# Patient Record
Sex: MALE | Race: WHITE | NOT HISPANIC OR LATINO | Employment: OTHER | URBAN - METROPOLITAN AREA
[De-identification: names, ages, dates, MRNs, and addresses within clinical notes are randomized per-mention and may not be internally consistent; named-entity substitution may affect disease eponyms.]

---

## 2020-10-14 ENCOUNTER — TELEPHONE (OUTPATIENT)
Dept: FAMILY MEDICINE CLINIC | Facility: CLINIC | Age: 70
End: 2020-10-14

## 2020-10-14 ENCOUNTER — OFFICE VISIT (OUTPATIENT)
Dept: FAMILY MEDICINE CLINIC | Facility: CLINIC | Age: 70
End: 2020-10-14
Payer: COMMERCIAL

## 2020-10-14 VITALS
SYSTOLIC BLOOD PRESSURE: 130 MMHG | TEMPERATURE: 97 F | WEIGHT: 231 LBS | DIASTOLIC BLOOD PRESSURE: 84 MMHG | HEIGHT: 70 IN | OXYGEN SATURATION: 98 % | BODY MASS INDEX: 33.07 KG/M2 | HEART RATE: 75 BPM

## 2020-10-14 DIAGNOSIS — J45.20 MILD INTERMITTENT EXTRINSIC ASTHMA WITHOUT COMPLICATION: ICD-10-CM

## 2020-10-14 DIAGNOSIS — Z23 ENCOUNTER FOR IMMUNIZATION: ICD-10-CM

## 2020-10-14 DIAGNOSIS — I10 ESSENTIAL HYPERTENSION: Primary | ICD-10-CM

## 2020-10-14 DIAGNOSIS — K92.1 BLOOD IN STOOL: ICD-10-CM

## 2020-10-14 DIAGNOSIS — Z13.220 ENCOUNTER FOR LIPID SCREENING FOR CARDIOVASCULAR DISEASE: ICD-10-CM

## 2020-10-14 DIAGNOSIS — Z13.6 ENCOUNTER FOR LIPID SCREENING FOR CARDIOVASCULAR DISEASE: ICD-10-CM

## 2020-10-14 DIAGNOSIS — K52.9 COLITIS: ICD-10-CM

## 2020-10-14 DIAGNOSIS — E78.5 HYPERLIPIDEMIA, UNSPECIFIED HYPERLIPIDEMIA TYPE: ICD-10-CM

## 2020-10-14 PROBLEM — J45.909 EXTRINSIC ASTHMA WITHOUT COMPLICATION: Status: ACTIVE | Noted: 2020-10-14

## 2020-10-14 PROCEDURE — 1101F PT FALLS ASSESS-DOCD LE1/YR: CPT | Performed by: FAMILY MEDICINE

## 2020-10-14 PROCEDURE — 1160F RVW MEDS BY RX/DR IN RCRD: CPT | Performed by: FAMILY MEDICINE

## 2020-10-14 PROCEDURE — 3288F FALL RISK ASSESSMENT DOCD: CPT | Performed by: FAMILY MEDICINE

## 2020-10-14 PROCEDURE — 99204 OFFICE O/P NEW MOD 45 MIN: CPT | Performed by: FAMILY MEDICINE

## 2020-10-14 PROCEDURE — 3725F SCREEN DEPRESSION PERFORMED: CPT | Performed by: FAMILY MEDICINE

## 2020-10-14 PROCEDURE — 3079F DIAST BP 80-89 MM HG: CPT | Performed by: FAMILY MEDICINE

## 2020-10-14 PROCEDURE — G0008 ADMIN INFLUENZA VIRUS VAC: HCPCS

## 2020-10-14 PROCEDURE — 90662 IIV NO PRSV INCREASED AG IM: CPT

## 2020-10-14 PROCEDURE — 1036F TOBACCO NON-USER: CPT | Performed by: FAMILY MEDICINE

## 2020-10-14 RX ORDER — ZOSTER VACCINE RECOMBINANT, ADJUVANTED 50 MCG/0.5
0.5 KIT INTRAMUSCULAR ONCE
Qty: 1 EACH | Refills: 1 | Status: SHIPPED | OUTPATIENT
Start: 2020-10-14 | End: 2020-10-14

## 2020-10-14 RX ORDER — AMLODIPINE BESYLATE 10 MG/1
10 TABLET ORAL DAILY
COMMUNITY
Start: 2020-09-01 | End: 2020-10-28 | Stop reason: SDUPTHER

## 2020-10-14 RX ORDER — ALBUTEROL SULFATE 90 UG/1
2 AEROSOL, METERED RESPIRATORY (INHALATION) EVERY 6 HOURS PRN
COMMUNITY

## 2020-10-14 RX ORDER — MESALAMINE 375 MG/1
1.5 CAPSULE, EXTENDED RELEASE ORAL EVERY MORNING
COMMUNITY
Start: 2020-10-02 | End: 2020-11-25 | Stop reason: SDUPTHER

## 2020-10-14 RX ORDER — DIPHENOXYLATE HYDROCHLORIDE AND ATROPINE SULFATE 2.5; .025 MG/1; MG/1
1 TABLET ORAL DAILY
COMMUNITY

## 2020-10-14 RX ORDER — BENAZEPRIL HYDROCHLORIDE 20 MG/1
20 TABLET ORAL EVERY MORNING
COMMUNITY
Start: 2020-09-01 | End: 2020-10-28 | Stop reason: SDUPTHER

## 2020-10-21 LAB
BASOPHILS # BLD AUTO: 70 CELLS/UL (ref 0–200)
BASOPHILS NFR BLD AUTO: 0.9 %
BUN SERPL-MCNC: 13 MG/DL (ref 7–25)
BUN/CREAT SERPL: ABNORMAL (CALC) (ref 6–22)
CALCIUM SERPL-MCNC: 9.2 MG/DL (ref 8.6–10.3)
CHLORIDE SERPL-SCNC: 102 MMOL/L (ref 98–110)
CHOLEST SERPL-MCNC: 212 MG/DL
CHOLEST/HDLC SERPL: 5.3 (CALC)
CO2 SERPL-SCNC: 33 MMOL/L (ref 20–32)
CREAT SERPL-MCNC: 0.79 MG/DL (ref 0.7–1.25)
EOSINOPHIL # BLD AUTO: 273 CELLS/UL (ref 15–500)
EOSINOPHIL NFR BLD AUTO: 3.5 %
ERYTHROCYTE [DISTWIDTH] IN BLOOD BY AUTOMATED COUNT: 13.1 % (ref 11–15)
GLUCOSE SERPL-MCNC: 92 MG/DL (ref 65–99)
HCT VFR BLD AUTO: 49.4 % (ref 38.5–50)
HDLC SERPL-MCNC: 40 MG/DL
HGB BLD-MCNC: 16.1 G/DL (ref 13.2–17.1)
LDLC SERPL CALC-MCNC: 140 MG/DL (CALC)
LYMPHOCYTES # BLD AUTO: 1654 CELLS/UL (ref 850–3900)
LYMPHOCYTES NFR BLD AUTO: 21.2 %
MCH RBC QN AUTO: 28.5 PG (ref 27–33)
MCHC RBC AUTO-ENTMCNC: 32.6 G/DL (ref 32–36)
MCV RBC AUTO: 87.6 FL (ref 80–100)
MONOCYTES # BLD AUTO: 671 CELLS/UL (ref 200–950)
MONOCYTES NFR BLD AUTO: 8.6 %
NEUTROPHILS # BLD AUTO: 5132 CELLS/UL (ref 1500–7800)
NEUTROPHILS NFR BLD AUTO: 65.8 %
NONHDLC SERPL-MCNC: 172 MG/DL (CALC)
PLATELET # BLD AUTO: 255 THOUSAND/UL (ref 140–400)
PMV BLD REES-ECKER: 10.1 FL (ref 7.5–12.5)
POTASSIUM SERPL-SCNC: 4.5 MMOL/L (ref 3.5–5.3)
RBC # BLD AUTO: 5.64 MILLION/UL (ref 4.2–5.8)
SL AMB EGFR AFRICAN AMERICAN: 107 ML/MIN/1.73M2
SL AMB EGFR NON AFRICAN AMERICAN: 92 ML/MIN/1.73M2
SODIUM SERPL-SCNC: 139 MMOL/L (ref 135–146)
TRIGL SERPL-MCNC: 185 MG/DL
WBC # BLD AUTO: 7.8 THOUSAND/UL (ref 3.8–10.8)

## 2020-10-28 ENCOUNTER — OFFICE VISIT (OUTPATIENT)
Dept: FAMILY MEDICINE CLINIC | Facility: CLINIC | Age: 70
End: 2020-10-28
Payer: COMMERCIAL

## 2020-10-28 VITALS
BODY MASS INDEX: 32.99 KG/M2 | DIASTOLIC BLOOD PRESSURE: 84 MMHG | TEMPERATURE: 97.3 F | HEART RATE: 77 BPM | SYSTOLIC BLOOD PRESSURE: 132 MMHG | OXYGEN SATURATION: 97 % | WEIGHT: 230.4 LBS | HEIGHT: 70 IN

## 2020-10-28 DIAGNOSIS — Z00.00 ENCOUNTER FOR MEDICARE ANNUAL WELLNESS EXAM: Primary | ICD-10-CM

## 2020-10-28 DIAGNOSIS — Z13.6 ENCOUNTER FOR ABDOMINAL AORTIC ANEURYSM (AAA) SCREENING: ICD-10-CM

## 2020-10-28 DIAGNOSIS — I10 ESSENTIAL HYPERTENSION: ICD-10-CM

## 2020-10-28 PROCEDURE — 3079F DIAST BP 80-89 MM HG: CPT | Performed by: FAMILY MEDICINE

## 2020-10-28 PROCEDURE — 1125F AMNT PAIN NOTED PAIN PRSNT: CPT | Performed by: FAMILY MEDICINE

## 2020-10-28 PROCEDURE — G0439 PPPS, SUBSEQ VISIT: HCPCS | Performed by: FAMILY MEDICINE

## 2020-10-28 PROCEDURE — 1170F FXNL STATUS ASSESSED: CPT | Performed by: FAMILY MEDICINE

## 2020-10-28 PROCEDURE — 1160F RVW MEDS BY RX/DR IN RCRD: CPT | Performed by: FAMILY MEDICINE

## 2020-10-28 PROCEDURE — 3725F SCREEN DEPRESSION PERFORMED: CPT | Performed by: FAMILY MEDICINE

## 2020-10-28 PROCEDURE — 3075F SYST BP GE 130 - 139MM HG: CPT | Performed by: FAMILY MEDICINE

## 2020-10-28 PROCEDURE — 3008F BODY MASS INDEX DOCD: CPT | Performed by: FAMILY MEDICINE

## 2020-10-28 RX ORDER — BENAZEPRIL HYDROCHLORIDE 20 MG/1
20 TABLET ORAL EVERY MORNING
Qty: 90 TABLET | Refills: 1 | Status: SHIPPED | OUTPATIENT
Start: 2020-10-28 | End: 2021-04-22

## 2020-10-28 RX ORDER — AMLODIPINE BESYLATE 10 MG/1
10 TABLET ORAL DAILY
Qty: 90 TABLET | Refills: 1 | Status: SHIPPED | OUTPATIENT
Start: 2020-10-28 | End: 2021-04-22

## 2020-11-18 ENCOUNTER — HOSPITAL ENCOUNTER (OUTPATIENT)
Dept: ULTRASOUND IMAGING | Facility: HOSPITAL | Age: 70
Discharge: HOME | End: 2020-11-18
Attending: FAMILY MEDICINE
Payer: COMMERCIAL

## 2020-11-18 DIAGNOSIS — Z13.6 ENCOUNTER FOR ABDOMINAL AORTIC ANEURYSM (AAA) SCREENING: ICD-10-CM

## 2020-11-18 PROCEDURE — 76706 US ABDL AORTA SCREEN AAA: CPT

## 2020-11-25 DIAGNOSIS — K52.9 COLITIS: ICD-10-CM

## 2020-11-25 DIAGNOSIS — K92.1 BLOOD IN STOOL: ICD-10-CM

## 2020-11-25 RX ORDER — MESALAMINE 375 MG/1
1.5 CAPSULE, EXTENDED RELEASE ORAL EVERY MORNING
Qty: 120 CAPSULE | Refills: 2 | Status: SHIPPED | OUTPATIENT
Start: 2020-11-25 | End: 2020-11-25

## 2020-11-25 RX ORDER — MESALAMINE 375 MG/1
1.5 CAPSULE, EXTENDED RELEASE ORAL EVERY MORNING
Qty: 120 CAPSULE | Refills: 2 | Status: SHIPPED | OUTPATIENT
Start: 2020-11-25 | End: 2022-01-07 | Stop reason: RX

## 2021-01-11 ENCOUNTER — OFFICE VISIT (OUTPATIENT)
Dept: FAMILY MEDICINE CLINIC | Facility: CLINIC | Age: 71
End: 2021-01-11
Payer: COMMERCIAL

## 2021-01-11 VITALS — TEMPERATURE: 97 F

## 2021-01-11 DIAGNOSIS — B34.9 VIRAL INFECTION, UNSPECIFIED: Primary | ICD-10-CM

## 2021-01-11 PROCEDURE — U0003 INFECTIOUS AGENT DETECTION BY NUCLEIC ACID (DNA OR RNA); SEVERE ACUTE RESPIRATORY SYNDROME CORONAVIRUS 2 (SARS-COV-2) (CORONAVIRUS DISEASE [COVID-19]), AMPLIFIED PROBE TECHNIQUE, MAKING USE OF HIGH THROUGHPUT TECHNOLOGIES AS DESCRIBED BY CMS-2020-01-R: HCPCS | Performed by: FAMILY MEDICINE

## 2021-01-11 PROCEDURE — U0005 INFEC AGEN DETEC AMPLI PROBE: HCPCS | Performed by: FAMILY MEDICINE

## 2021-01-11 PROCEDURE — 99441 PR PHYS/QHP TELEPHONE EVALUATION 5-10 MIN: CPT | Performed by: FAMILY MEDICINE

## 2021-01-11 RX ORDER — FLUTICASONE PROPIONATE 50 MCG
1 SPRAY, SUSPENSION (ML) NASAL DAILY
Qty: 1 BOTTLE | Refills: 2 | Status: SHIPPED | OUTPATIENT
Start: 2021-01-11 | End: 2021-04-05

## 2021-01-11 NOTE — PROGRESS NOTES
Virtual Brief Visit  Assessment/Plan:    Problem List Items Addressed This Visit     None      Visit Diagnoses     Viral infection, unspecified    -  Primary    Relevant Medications    fluticasone (FLONASE) 50 mcg/act nasal spray    Other Relevant Orders    Novel Coronavirus (COVID-19), PCR [facility] - Collected in Office           Reason for visit is   Chief Complaint   Patient presents with    Sore Throat     x 1 week     Nasal Congestion    Fatigue    Virtual Brief Visit      Encounter provider El Scherer DO    Provider located at [address] [facility]  [address]  [facility] [address] [facility]  [phone]    Recent Visits  No visits were found meeting these conditions  Showing recent visits within past 7 days and meeting all other requirements     Today's Visits  Date Type Provider Dept   01/11/21 Office Visit El Scherer DO [facility] [address] [place] [address]   Showing today's visits and meeting all other requirements     Future Appointments  No visits were found meeting these conditions  Showing future appointments within next 150 days and meeting all other requirements      After connecting through telephone, the patient was identified by name and date of birth  Enrique Harden was informed that this is a telemedicine visit and that the visit is being conducted through telephone  My office door was closed  No one else was in the room  He acknowledged consent and understanding of privacy and security of the platform  The patient has agreed to participate and understands he can discontinue the visit at any time  Patient is aware this is a billable service  Subjective    Enrique Harden is a 78 y o  male for virtual visit  Done via telephone as video visit was attempted and failed  HPI     States that he began with cold symptoms about 1 week     Chills, sore throat, nasal congestion, fatigue, muscle aches, ear clogged/pressure  Denies headaches or cough  Denies SOB or CP  Denies N/V/diarrhea  Notes that he has only been around his sister and brother in law  They are not sick  Denies any known sick contacts  Notes that he has been in the food store, wearing mask  Using saline nasal spray  Using Coricidin HBP (notes that this irritate his colitis)  History reviewed  No pertinent past medical history  History reviewed  No pertinent surgical history  Current Outpatient Medications   Medication Sig Dispense Refill    albuterol (5 mg/mL) 0 5 % nebulizer solution Take 2 5 mg by nebulization every 6 (six) hours as needed for wheezing      albuterol (PROVENTIL HFA,VENTOLIN HFA) 90 mcg/act inhaler Inhale 2 puffs every 6 (six) hours as needed for wheezing      amLODIPine (NORVASC) 10 mg tablet Take 1 tablet (10 mg total) by mouth daily 90 tablet 1    benazepril (LOTENSIN) 20 mg tablet Take 1 tablet (20 mg total) by mouth every morning 90 tablet 1    fluticasone (FLONASE) 50 mcg/act nasal spray 1 spray into each nostril daily 1 Bottle 2    mesalamine (APRISO) 0 375 g 24 hr capsule TAKE 4 CAPSULES (1 5 G TOTAL) BY MOUTH EVERY MORNING ONCE A DAY 4 PILLS IN THE MORNING 120 capsule 2    multivitamin (THERAGRAN) TABS Take 1 tablet by mouth daily      Red Yeast Rice Extract (RED YEAST RICE PO) Take by mouth       No current facility-administered medications for this visit  No Known Allergies    Review of Systems   Constitutional: Positive for chills and fatigue  Negative for fever  HENT: Positive for congestion, ear pain and sore throat  Negative for ear discharge, hearing loss, postnasal drip, rhinorrhea and sinus pain  Eyes: Negative for pain  Respiratory: Negative for cough and shortness of breath  Cardiovascular: Negative for chest pain and leg swelling  Gastrointestinal: Negative for abdominal pain, constipation, diarrhea, nausea and vomiting     Genitourinary: Negative for difficulty urinating, dysuria, frequency and urgency  Musculoskeletal: Positive for myalgias  Negative for neck pain  Skin: Negative for rash  Neurological: Negative for dizziness and headaches  Psychiatric/Behavioral: Negative for sleep disturbance  Vitals:    01/11/21 1415   Temp: (!) 97 °F (36 1 °C)     I spent 6 minutes directly with the patient during this visit    VIRTUAL VISIT DISCLAIMER    Gavino Moralezes acknowledges that he has consented to an online visit or consultation  He understands that the online visit is based solely on information provided by him, and that, in the absence of a face-to-face physical evaluation by the physician, the diagnosis he receives is both limited and provisional in terms of accuracy and completeness  This is not intended to replace a full medical face-to-face evaluation by the physician  Gavino Calles understands and accepts these terms      DO [facility]  1/11/2021 3:52 PM

## 2021-01-13 LAB — SARS-COV-2 RNA SPEC QL NAA+PROBE: NOT DETECTED

## 2021-01-14 ENCOUNTER — OFFICE VISIT (OUTPATIENT)
Dept: FAMILY MEDICINE CLINIC | Facility: CLINIC | Age: 71
End: 2021-01-14
Payer: COMMERCIAL

## 2021-01-14 VITALS
SYSTOLIC BLOOD PRESSURE: 116 MMHG | WEIGHT: 232.2 LBS | HEART RATE: 69 BPM | OXYGEN SATURATION: 96 % | TEMPERATURE: 97.8 F | HEIGHT: 70 IN | BODY MASS INDEX: 33.24 KG/M2 | DIASTOLIC BLOOD PRESSURE: 86 MMHG

## 2021-01-14 DIAGNOSIS — J45.20 MILD INTERMITTENT EXTRINSIC ASTHMA WITHOUT COMPLICATION: ICD-10-CM

## 2021-01-14 DIAGNOSIS — J02.9 SORE THROAT: ICD-10-CM

## 2021-01-14 DIAGNOSIS — J02.9 VIRAL PHARYNGITIS: Primary | ICD-10-CM

## 2021-01-14 LAB — S PYO AG THROAT QL: NEGATIVE

## 2021-01-14 PROCEDURE — 87880 STREP A ASSAY W/OPTIC: CPT | Performed by: FAMILY MEDICINE

## 2021-01-14 PROCEDURE — 87070 CULTURE OTHR SPECIMN AEROBIC: CPT | Performed by: FAMILY MEDICINE

## 2021-01-14 PROCEDURE — 99213 OFFICE O/P EST LOW 20 MIN: CPT | Performed by: FAMILY MEDICINE

## 2021-01-14 PROCEDURE — 3079F DIAST BP 80-89 MM HG: CPT | Performed by: FAMILY MEDICINE

## 2021-01-14 PROCEDURE — 3074F SYST BP LT 130 MM HG: CPT | Performed by: FAMILY MEDICINE

## 2021-01-14 NOTE — PROGRESS NOTES
Assessment/Plan:    No problem-specific Assessment & Plan notes found for this encounter  Diagnoses and all orders for this visit:    Viral pharyngitis  Sore throat  Strep negative  Will check culture, strep less likely than viral etiology  Discussed OTC management with avoidance of NSAIDs  Patient to continue with Flonase, salt water gargles, tea with honey and acetaminophen PRN  Patient to follow up in 5 days if no improvement    -     POCT rapid strepA  -     Throat culture; Future    Mild intermittent extrinsic asthma without complication  Stable, refill of albuterol sent  Nebulized is more affordable for patient than HFA  -     albuterol (5 mg/mL) 0 5 % nebulizer solution; Take 0 5 mL (2 5 mg total) by nebulization every 6 (six) hours as needed for wheezing        Subjective:      Patient ID: Lorraine Huerta is a 78 y o  male  Sore Throat   This is a new problem  The current episode started 1 to 4 weeks ago  The problem has been unchanged  The pain is worse on the right side  There has been no fever  The pain is at a severity of 4/10  The pain is mild  Associated symptoms include congestion, coughing, ear pain and headaches  Pertinent negatives include no abdominal pain, diarrhea, drooling, ear discharge, hoarse voice, plugged ear sensation, neck pain, shortness of breath, stridor, swollen glands, trouble swallowing or vomiting  He has had no exposure to strep or mono  He has tried gargles and cool liquids for the symptoms  The treatment provided mild relief  Know history of colitis  Last bloody BM about 1 week ago  This happened after he was taking OTC cold and flu medication  States that he stopped taking the medication and the bloody BMs stopped  Denies abdominal pain or discomfort       The following portions of the patient's history were reviewed and updated as appropriate: allergies, current medications, past family history, past medical history, past social history, past surgical history and problem list     Review of Systems   Constitutional: Negative for activity change, appetite change, chills and fever  HENT: Positive for congestion, ear pain, sinus pressure and sore throat  Negative for drooling, ear discharge, hoarse voice, sinus pain and trouble swallowing  Respiratory: Positive for cough  Negative for shortness of breath and stridor  Cardiovascular: Negative for chest pain, palpitations and leg swelling  Gastrointestinal: Negative for abdominal pain, blood in stool, diarrhea and vomiting  Genitourinary: Negative for dysuria, frequency, hematuria and urgency  Musculoskeletal: Negative for neck pain  Skin: Negative for rash  Neurological: Positive for headaches  Negative for dizziness and light-headedness  Objective:    /86   Pulse 69   Temp 97 8 °F (36 6 °C)   Ht 5' 10" (1 778 m)   Wt 105 kg (232 lb 3 2 oz)   SpO2 96%   BMI 33 32 kg/m²    on presentation      Physical Exam  Vitals signs reviewed  Constitutional:       General: He is not in acute distress  Appearance: Normal appearance  HENT:      Head: Normocephalic and atraumatic  Right Ear: Ear canal and external ear normal  No drainage or tenderness  A middle ear effusion (clear fluid) is present  Tympanic membrane is not erythematous  Left Ear: Tympanic membrane, ear canal and external ear normal  No drainage or tenderness  No middle ear effusion  Tympanic membrane is not erythematous  Nose: Nose normal       Mouth/Throat:      Mouth: Mucous membranes are moist  No oral lesions  Pharynx: Uvula midline  Posterior oropharyngeal erythema present  No pharyngeal swelling or oropharyngeal exudate  Eyes:      Extraocular Movements: Extraocular movements intact  Conjunctiva/sclera: Conjunctivae normal       Pupils: Pupils are equal, round, and reactive to light  Neck:      Musculoskeletal: Neck supple  Thyroid: No thyromegaly     Cardiovascular:      Rate and Rhythm: Normal rate and regular rhythm  Heart sounds: Normal heart sounds  Pulmonary:      Effort: No respiratory distress  Breath sounds: Normal breath sounds  No wheezing, rhonchi or rales  Abdominal:      General: Abdomen is flat  Bowel sounds are normal  There is no distension  Palpations: Abdomen is soft  There is no mass  Tenderness: There is no abdominal tenderness  There is no guarding  Musculoskeletal:      Right lower leg: No edema  Left lower leg: No edema  Lymphadenopathy:      Cervical: Cervical adenopathy (shotty adenopathy along the right side) present  Skin:     General: Skin is warm  Capillary Refill: Capillary refill takes less than 2 seconds  Neurological:      Mental Status: He is alert  Mental status is at baseline           Sara Melendez DO  [facility]  1/14/2021 9:06 AM

## 2021-01-17 LAB — BACTERIA THROAT CULT: NORMAL

## 2021-01-28 ENCOUNTER — OFFICE VISIT (OUTPATIENT)
Dept: GASTROENTEROLOGY | Facility: CLINIC | Age: 71
End: 2021-01-28
Payer: COMMERCIAL

## 2021-01-28 VITALS — BODY MASS INDEX: 33.47 KG/M2 | HEIGHT: 70 IN | WEIGHT: 233.8 LBS

## 2021-01-28 DIAGNOSIS — K51.90 ULCERATIVE COLITIS WITHOUT COMPLICATIONS, UNSPECIFIED LOCATION (HCC): Primary | ICD-10-CM

## 2021-01-28 PROCEDURE — 3008F BODY MASS INDEX DOCD: CPT | Performed by: PHYSICIAN ASSISTANT

## 2021-01-28 PROCEDURE — 1160F RVW MEDS BY RX/DR IN RCRD: CPT | Performed by: PHYSICIAN ASSISTANT

## 2021-01-28 PROCEDURE — 99203 OFFICE O/P NEW LOW 30 MIN: CPT | Performed by: PHYSICIAN ASSISTANT

## 2021-01-28 PROCEDURE — 1036F TOBACCO NON-USER: CPT | Performed by: PHYSICIAN ASSISTANT

## 2021-01-28 RX ORDER — MESALAMINE 375 MG/1
1500 CAPSULE, EXTENDED RELEASE ORAL DAILY
Qty: 120 CAPSULE | Refills: 5 | Status: SHIPPED | OUTPATIENT
Start: 2021-01-28 | End: 2021-10-13

## 2021-04-05 DIAGNOSIS — B34.9 VIRAL INFECTION, UNSPECIFIED: ICD-10-CM

## 2021-04-05 RX ORDER — FLUTICASONE PROPIONATE 50 MCG
SPRAY, SUSPENSION (ML) NASAL
Qty: 48 ML | Refills: 0 | Status: SHIPPED | OUTPATIENT
Start: 2021-04-05

## 2021-04-22 DIAGNOSIS — I10 ESSENTIAL HYPERTENSION: ICD-10-CM

## 2021-04-22 RX ORDER — AMLODIPINE BESYLATE 10 MG/1
TABLET ORAL
Qty: 90 TABLET | Refills: 1 | Status: SHIPPED | OUTPATIENT
Start: 2021-04-22 | End: 2021-04-28 | Stop reason: SDUPTHER

## 2021-04-22 RX ORDER — BENAZEPRIL HYDROCHLORIDE 20 MG/1
TABLET ORAL
Qty: 90 TABLET | Refills: 1 | Status: SHIPPED | OUTPATIENT
Start: 2021-04-22 | End: 2021-04-28 | Stop reason: SDUPTHER

## 2021-04-28 ENCOUNTER — OFFICE VISIT (OUTPATIENT)
Dept: FAMILY MEDICINE CLINIC | Facility: CLINIC | Age: 71
End: 2021-04-28
Payer: COMMERCIAL

## 2021-04-28 VITALS
DIASTOLIC BLOOD PRESSURE: 70 MMHG | WEIGHT: 228 LBS | TEMPERATURE: 97.3 F | HEART RATE: 70 BPM | BODY MASS INDEX: 32.64 KG/M2 | OXYGEN SATURATION: 96 % | HEIGHT: 70 IN | SYSTOLIC BLOOD PRESSURE: 116 MMHG

## 2021-04-28 DIAGNOSIS — I10 ESSENTIAL HYPERTENSION: Primary | ICD-10-CM

## 2021-04-28 DIAGNOSIS — K51.90 ULCERATIVE COLITIS WITHOUT COMPLICATIONS, UNSPECIFIED LOCATION (HCC): ICD-10-CM

## 2021-04-28 DIAGNOSIS — J45.20 MILD INTERMITTENT EXTRINSIC ASTHMA WITHOUT COMPLICATION: ICD-10-CM

## 2021-04-28 PROCEDURE — 3074F SYST BP LT 130 MM HG: CPT | Performed by: FAMILY MEDICINE

## 2021-04-28 PROCEDURE — 3078F DIAST BP <80 MM HG: CPT | Performed by: FAMILY MEDICINE

## 2021-04-28 PROCEDURE — 3008F BODY MASS INDEX DOCD: CPT | Performed by: FAMILY MEDICINE

## 2021-04-28 PROCEDURE — 1036F TOBACCO NON-USER: CPT | Performed by: FAMILY MEDICINE

## 2021-04-28 PROCEDURE — 3725F SCREEN DEPRESSION PERFORMED: CPT | Performed by: FAMILY MEDICINE

## 2021-04-28 PROCEDURE — 1160F RVW MEDS BY RX/DR IN RCRD: CPT | Performed by: FAMILY MEDICINE

## 2021-04-28 PROCEDURE — 99214 OFFICE O/P EST MOD 30 MIN: CPT | Performed by: FAMILY MEDICINE

## 2021-04-28 RX ORDER — AMLODIPINE BESYLATE 10 MG/1
10 TABLET ORAL DAILY
Qty: 90 TABLET | Refills: 1 | Status: SHIPPED | OUTPATIENT
Start: 2021-04-28 | End: 2021-08-18 | Stop reason: ALTCHOICE

## 2021-04-28 RX ORDER — BENAZEPRIL HYDROCHLORIDE 20 MG/1
20 TABLET ORAL EVERY MORNING
Qty: 90 TABLET | Refills: 1 | Status: SHIPPED | OUTPATIENT
Start: 2021-04-28 | End: 2021-10-28 | Stop reason: SDUPTHER

## 2021-04-28 NOTE — PROGRESS NOTES
Assessment/Plan:    No problem-specific Assessment & Plan notes found for this encounter  Diagnoses and all orders for this visit:    Essential hypertension  BP well controlled  Continue current medications, refills provided  -     benazepril (LOTENSIN) 20 mg tablet; Take 1 tablet (20 mg total) by mouth every morning  -     amLODIPine (NORVASC) 10 mg tablet; Take 1 tablet (10 mg total) by mouth daily    Ulcerative colitis without complications, unspecified location ([facility] )  Stable, continue with GI  Follow up the summer  Patient to continue with the mesalamine  Mild intermittent extrinsic asthma without complication  Stable, doing well with no complaints  Patient to continue with PRN albuterol  Encouraged patient to complete COVID vaccine  Declined J&J due to Sikh reasons, declined Harrison Li, would only like Moderna vaccine  He would like to hold off at this time as he is waiting for Alvekrishna Mancia to be available in [facility]  Discussed that this may not become available again but he is going to wait and see  Follow up in 6 months  Subjective:      Patient ID: Lawrance Mohs is a 78 y o  male  HPI  Patient presents to the office for follow up  States that he has established with GI and things are going well from a GI standpoint  Denies bloody BMs or abdominal pain  He has been complaint with his medications, needs a refill on his BP medications  Asthma symptoms have been well controlled  No used on inhaler lately  Denies CP/SOB  Notes that he had COVID vaccine appointment and cancelled because it was J&J  He would like the Moderna vaccine, he is not interested in travelling to get the vaccine completed       The following portions of the patient's history were reviewed and updated as appropriate: allergies, current medications, past family history, past medical history, past social history, past surgical history and problem list     Review of Systems   Constitutional: Negative for chills, fatigue and fever  HENT: Negative for congestion, ear pain, rhinorrhea, sinus pain and sore throat  Eyes: Negative for pain  Respiratory: Negative for cough and shortness of breath  Cardiovascular: Negative for chest pain and leg swelling  Gastrointestinal: Negative for abdominal pain, anal bleeding, blood in stool, constipation, diarrhea, nausea and vomiting  Genitourinary: Negative for difficulty urinating, dysuria, frequency and urgency  Musculoskeletal: Negative for neck pain  Skin: Negative for rash  Neurological: Negative for dizziness, light-headedness and headaches  Objective:    /70 (BP Location: Left arm, Patient Position: Sitting, Cuff Size: Large)   Pulse 70   Temp (!) 97 3 °F (36 3 °C)   Ht 5' 10" (1 778 m)   Wt 103 kg (228 lb)   SpO2 96%   BMI 32 71 kg/m²      Physical Exam  Vitals signs reviewed  Constitutional:       General: He is not in acute distress  Appearance: Normal appearance  HENT:      Head: Normocephalic and atraumatic  Right Ear: External ear normal       Left Ear: External ear normal       Nose: Nose normal       Mouth/Throat:      Mouth: Mucous membranes are moist    Eyes:      Extraocular Movements: Extraocular movements intact  Conjunctiva/sclera: Conjunctivae normal    Cardiovascular:      Rate and Rhythm: Normal rate and regular rhythm  Heart sounds: Normal heart sounds  Pulmonary:      Breath sounds: Normal breath sounds  Abdominal:      General: Bowel sounds are normal  There is no distension  Palpations: Abdomen is soft  There is no mass  Tenderness: There is no abdominal tenderness  There is no guarding  Musculoskeletal:      Right lower leg: No edema  Left lower leg: No edema  Skin:     General: Skin is warm  Capillary Refill: Capillary refill takes less than 2 seconds  Neurological:      Mental Status: He is alert  Mental status is at baseline           [facility]  4/28/2021 10:52 AM

## 2021-08-18 ENCOUNTER — OFFICE VISIT (OUTPATIENT)
Dept: FAMILY MEDICINE CLINIC | Facility: CLINIC | Age: 71
End: 2021-08-18
Payer: COMMERCIAL

## 2021-08-18 VITALS
SYSTOLIC BLOOD PRESSURE: 112 MMHG | HEIGHT: 70 IN | TEMPERATURE: 96.7 F | WEIGHT: 236 LBS | OXYGEN SATURATION: 98 % | BODY MASS INDEX: 33.79 KG/M2 | HEART RATE: 92 BPM | DIASTOLIC BLOOD PRESSURE: 78 MMHG

## 2021-08-18 DIAGNOSIS — R60.0 BILATERAL LOWER EXTREMITY EDEMA: Primary | ICD-10-CM

## 2021-08-18 DIAGNOSIS — I10 ESSENTIAL HYPERTENSION: ICD-10-CM

## 2021-08-18 PROCEDURE — 3074F SYST BP LT 130 MM HG: CPT | Performed by: FAMILY MEDICINE

## 2021-08-18 PROCEDURE — 3078F DIAST BP <80 MM HG: CPT | Performed by: FAMILY MEDICINE

## 2021-08-18 PROCEDURE — 99214 OFFICE O/P EST MOD 30 MIN: CPT | Performed by: FAMILY MEDICINE

## 2021-08-18 PROCEDURE — 3725F SCREEN DEPRESSION PERFORMED: CPT | Performed by: FAMILY MEDICINE

## 2021-08-18 PROCEDURE — 1160F RVW MEDS BY RX/DR IN RCRD: CPT | Performed by: FAMILY MEDICINE

## 2021-08-18 PROCEDURE — 1101F PT FALLS ASSESS-DOCD LE1/YR: CPT | Performed by: FAMILY MEDICINE

## 2021-08-18 PROCEDURE — 1036F TOBACCO NON-USER: CPT | Performed by: FAMILY MEDICINE

## 2021-08-18 PROCEDURE — 3008F BODY MASS INDEX DOCD: CPT | Performed by: FAMILY MEDICINE

## 2021-08-18 PROCEDURE — 3288F FALL RISK ASSESSMENT DOCD: CPT | Performed by: FAMILY MEDICINE

## 2021-08-18 RX ORDER — HYDROCHLOROTHIAZIDE 12.5 MG/1
12.5 TABLET ORAL DAILY
Qty: 30 TABLET | Refills: 1 | Status: SHIPPED | OUTPATIENT
Start: 2021-08-18 | End: 2021-09-01 | Stop reason: SDUPTHER

## 2021-08-18 NOTE — PROGRESS NOTES
Assessment/Plan:    No problem-specific Assessment & Plan notes found for this encounter  Diagnoses and all orders for this visit:    Bilateral lower extremity edema  Will stop Amlodipine, BP well controlled and possibly contributing to LE edema  Vinson tart HCTZ to replace Amlodipine and help with edema   -     Echo complete with contrast if indicated; Future  -     hydrochlorothiazide (HYDRODIURIL) 12 5 mg tablet; Take 1 tablet (12 5 mg total) by mouth daily    Essential hypertension  Monitor BP at home  -     hydrochlorothiazide (HYDRODIURIL) 12 5 mg tablet; Take 1 tablet (12 5 mg total) by mouth daily      F/U in 4 weeks  Subjective:      Patient ID: Canelo Lee is a 73 y o  male  HPI   Patient presents to the office for evaluation of LE edema  Notes that he has had leg swelling for the last few weeks  Denies that there is some burning in the legs at night  Denies numbness  Notes that he feels tension in his legs  Denies chest pain or SOB  States that this has not happened before  Notes that he has been eating more salty foods recently  No cardiac history  Notes that he had the first dose of the Moderna vaccine, he did not get the second dose vaccine  States that he was having significant brain fog and cramps after that vaccine so he did not go for the second  The following portions of the patient's history were reviewed and updated as appropriate: allergies, current medications, past family history, past medical history, past social history, past surgical history and problem list     Review of Systems   Constitutional: Negative for chills, fatigue and fever  HENT: Negative for congestion, ear pain, rhinorrhea, sinus pain and sore throat  Respiratory: Negative for cough and shortness of breath  Cardiovascular: Positive for leg swelling  Negative for chest pain and palpitations  Gastrointestinal: Negative for abdominal pain, constipation, diarrhea, nausea and vomiting  Genitourinary: Negative for dysuria, frequency and urgency  Musculoskeletal: Negative for neck pain  Skin: Negative for rash  Neurological: Negative for dizziness, light-headedness and headaches  Psychiatric/Behavioral: Negative for sleep disturbance  Objective:  /78 (BP Location: Left arm, Patient Position: Sitting, Cuff Size: Large)   Pulse 92   Temp (!) 96 7 °F (35 9 °C)   Ht 5' 10" (1 778 m)   Wt 107 kg (236 lb)   SpO2 98%   BMI 33 86 kg/m²      Physical Exam  Vitals reviewed  Constitutional:       General: He is not in acute distress  Appearance: Normal appearance  HENT:      Head: Normocephalic and atraumatic  Right Ear: External ear normal       Left Ear: External ear normal       Nose: Nose normal       Mouth/Throat:      Mouth: Mucous membranes are moist    Eyes:      Extraocular Movements: Extraocular movements intact  Conjunctiva/sclera: Conjunctivae normal    Cardiovascular:      Rate and Rhythm: Normal rate and regular rhythm  Heart sounds: Normal heart sounds  Pulmonary:      Effort: Pulmonary effort is normal       Breath sounds: Normal breath sounds  No wheezing, rhonchi or rales  Abdominal:      General: Bowel sounds are normal  There is no distension  Palpations: Abdomen is soft  There is no mass  Tenderness: There is no abdominal tenderness  There is no guarding  Musculoskeletal:      Right lower leg: Edema (+2 to the knee) present  Left lower leg: Edema (+2 to the knee) present  Skin:     General: Skin is warm  Capillary Refill: Capillary refill takes less than 2 seconds  Findings: No erythema (or warmth of B/L LE)  Neurological:      Mental Status: He is alert  Mental status is at baseline           Simran Espino [facility]  8/18/2021 11:55 AM

## 2021-09-01 ENCOUNTER — TELEPHONE (OUTPATIENT)
Dept: FAMILY MEDICINE CLINIC | Facility: CLINIC | Age: 71
End: 2021-09-01

## 2021-09-01 DIAGNOSIS — R60.0 BILATERAL LOWER EXTREMITY EDEMA: ICD-10-CM

## 2021-09-01 DIAGNOSIS — I10 ESSENTIAL HYPERTENSION: ICD-10-CM

## 2021-09-01 RX ORDER — HYDROCHLOROTHIAZIDE 25 MG/1
25 TABLET ORAL DAILY
Qty: 90 TABLET | Refills: 1 | Status: SHIPPED | OUTPATIENT
Start: 2021-09-01 | End: 2021-10-28 | Stop reason: SDUPTHER

## 2021-09-01 NOTE — TELEPHONE ENCOUNTER
1  He should be tested 5 days after his exposure and he should be masking untl he is 14 days from exposure  2  Refill sent in for her BP medication, HCTZ  Sent 25 mg tablet so patient only needs to take one tablet at a time when he picks up the refill  3  Sounds good, we will follow up when we see the echo results       [facility]  9/1/2021 1:09 PM

## 2021-09-01 NOTE — TELEPHONE ENCOUNTER
T/c from pt -- multiple issues:    1  He was exposed to a person positive for covid on 8/26  Had diarrhea on 8/28, but that was the only s/s he has had (said he had one of the moderna vaccines, but due to reaction he couldn't have the second)  Would like to know if he should be tested?/have visit? 2  Last time he saw doctor, she changed his bp meds (pt does not know the name of the new med  He was told that if his bp goes up take 2 of the new med -- now he is going thru them faster he will need refill  3   Reports his echo is scheduled for 9/16

## 2021-09-16 ENCOUNTER — HOSPITAL ENCOUNTER (OUTPATIENT)
Dept: NON INVASIVE DIAGNOSTICS | Facility: CLINIC | Age: 71
Discharge: HOME | End: 2021-09-16
Payer: COMMERCIAL

## 2021-09-16 DIAGNOSIS — R60.0 BILATERAL LOWER EXTREMITY EDEMA: ICD-10-CM

## 2021-09-16 PROCEDURE — 93306 TTE W/DOPPLER COMPLETE: CPT | Performed by: INTERNAL MEDICINE

## 2021-09-16 PROCEDURE — 93306 TTE W/DOPPLER COMPLETE: CPT

## 2021-09-28 ENCOUNTER — VBI (OUTPATIENT)
Dept: ADMINISTRATIVE | Facility: OTHER | Age: 71
End: 2021-09-28

## 2021-09-28 NOTE — TELEPHONE ENCOUNTER
09/28/21 2:14 PM     See documentation in the VB [employer] SmartForm       Bennett Howard Advised to call immediately if any worsening distortion or blurring is noted.

## 2021-10-09 DIAGNOSIS — K51.90 ULCERATIVE COLITIS WITHOUT COMPLICATIONS, UNSPECIFIED LOCATION (HCC): ICD-10-CM

## 2021-10-13 RX ORDER — MESALAMINE 375 MG/1
1500 CAPSULE, EXTENDED RELEASE ORAL DAILY
Qty: 360 CAPSULE | Refills: 1 | Status: SHIPPED | OUTPATIENT
Start: 2021-10-13 | End: 2022-01-07 | Stop reason: RX

## 2021-10-21 ENCOUNTER — RA CDI HCC (OUTPATIENT)
Dept: OTHER | Facility: HOSPITAL | Age: 71
End: 2021-10-21

## 2021-10-28 ENCOUNTER — OFFICE VISIT (OUTPATIENT)
Dept: FAMILY MEDICINE CLINIC | Facility: CLINIC | Age: 71
End: 2021-10-28
Payer: COMMERCIAL

## 2021-10-28 VITALS
TEMPERATURE: 64 F | BODY MASS INDEX: 33.5 KG/M2 | DIASTOLIC BLOOD PRESSURE: 96 MMHG | OXYGEN SATURATION: 96 % | HEART RATE: 80 BPM | SYSTOLIC BLOOD PRESSURE: 150 MMHG | WEIGHT: 234 LBS | HEIGHT: 70 IN

## 2021-10-28 DIAGNOSIS — R60.0 BILATERAL LOWER EXTREMITY EDEMA: ICD-10-CM

## 2021-10-28 DIAGNOSIS — I10 ESSENTIAL HYPERTENSION: Primary | ICD-10-CM

## 2021-10-28 PROCEDURE — 1160F RVW MEDS BY RX/DR IN RCRD: CPT | Performed by: FAMILY MEDICINE

## 2021-10-28 PROCEDURE — 1036F TOBACCO NON-USER: CPT | Performed by: FAMILY MEDICINE

## 2021-10-28 PROCEDURE — 3077F SYST BP >= 140 MM HG: CPT | Performed by: FAMILY MEDICINE

## 2021-10-28 PROCEDURE — 99214 OFFICE O/P EST MOD 30 MIN: CPT | Performed by: FAMILY MEDICINE

## 2021-10-28 PROCEDURE — 3080F DIAST BP >= 90 MM HG: CPT | Performed by: FAMILY MEDICINE

## 2021-10-28 PROCEDURE — 3008F BODY MASS INDEX DOCD: CPT | Performed by: FAMILY MEDICINE

## 2021-10-28 PROCEDURE — 3725F SCREEN DEPRESSION PERFORMED: CPT | Performed by: FAMILY MEDICINE

## 2021-10-28 RX ORDER — HYDROCHLOROTHIAZIDE 25 MG/1
25 TABLET ORAL DAILY
Qty: 90 TABLET | Refills: 1 | Status: SHIPPED | OUTPATIENT
Start: 2021-10-28 | End: 2022-04-25 | Stop reason: SDUPTHER

## 2021-10-28 RX ORDER — BENAZEPRIL HYDROCHLORIDE 20 MG/1
20 TABLET ORAL EVERY MORNING
Qty: 90 TABLET | Refills: 1 | Status: SHIPPED | OUTPATIENT
Start: 2021-10-28 | End: 2022-03-21 | Stop reason: SDUPTHER

## 2021-11-10 ENCOUNTER — TELEPHONE (OUTPATIENT)
Dept: FAMILY MEDICINE CLINIC | Facility: CLINIC | Age: 71
End: 2021-11-10

## 2021-11-17 ENCOUNTER — RA CDI HCC (OUTPATIENT)
Dept: OTHER | Facility: HOSPITAL | Age: 71
End: 2021-11-17

## 2021-11-24 ENCOUNTER — OFFICE VISIT (OUTPATIENT)
Dept: FAMILY MEDICINE CLINIC | Facility: CLINIC | Age: 71
End: 2021-11-24
Payer: COMMERCIAL

## 2021-11-24 VITALS
OXYGEN SATURATION: 98 % | DIASTOLIC BLOOD PRESSURE: 88 MMHG | HEART RATE: 88 BPM | BODY MASS INDEX: 33.5 KG/M2 | SYSTOLIC BLOOD PRESSURE: 130 MMHG | WEIGHT: 234 LBS | HEIGHT: 70 IN | TEMPERATURE: 96.1 F

## 2021-11-24 DIAGNOSIS — K51.90 ULCERATIVE COLITIS WITHOUT COMPLICATIONS, UNSPECIFIED LOCATION (HCC): ICD-10-CM

## 2021-11-24 DIAGNOSIS — Z00.00 ENCOUNTER FOR MEDICARE ANNUAL WELLNESS EXAM: Primary | ICD-10-CM

## 2021-11-24 DIAGNOSIS — I10 ESSENTIAL HYPERTENSION: ICD-10-CM

## 2021-11-24 PROCEDURE — 99214 OFFICE O/P EST MOD 30 MIN: CPT | Performed by: FAMILY MEDICINE

## 2021-11-24 PROCEDURE — 1170F FXNL STATUS ASSESSED: CPT | Performed by: FAMILY MEDICINE

## 2021-11-24 PROCEDURE — 1160F RVW MEDS BY RX/DR IN RCRD: CPT | Performed by: FAMILY MEDICINE

## 2021-11-24 PROCEDURE — G0439 PPPS, SUBSEQ VISIT: HCPCS | Performed by: FAMILY MEDICINE

## 2021-11-24 PROCEDURE — 1036F TOBACCO NON-USER: CPT | Performed by: FAMILY MEDICINE

## 2021-11-24 PROCEDURE — G0444 DEPRESSION SCREEN ANNUAL: HCPCS | Performed by: FAMILY MEDICINE

## 2021-11-24 PROCEDURE — 3288F FALL RISK ASSESSMENT DOCD: CPT | Performed by: FAMILY MEDICINE

## 2021-11-24 PROCEDURE — 3079F DIAST BP 80-89 MM HG: CPT | Performed by: FAMILY MEDICINE

## 2021-11-24 PROCEDURE — 3725F SCREEN DEPRESSION PERFORMED: CPT | Performed by: FAMILY MEDICINE

## 2021-11-24 PROCEDURE — 3008F BODY MASS INDEX DOCD: CPT | Performed by: FAMILY MEDICINE

## 2021-11-24 PROCEDURE — 3075F SYST BP GE 130 - 139MM HG: CPT | Performed by: FAMILY MEDICINE

## 2021-11-24 PROCEDURE — 1125F AMNT PAIN NOTED PAIN PRSNT: CPT | Performed by: FAMILY MEDICINE

## 2021-11-26 LAB
ALBUMIN SERPL-MCNC: 4 G/DL (ref 3.6–5.1)
ALBUMIN/GLOB SERPL: 1.5 (CALC) (ref 1–2.5)
ALP SERPL-CCNC: 79 U/L (ref 35–144)
ALT SERPL-CCNC: 20 U/L (ref 9–46)
AST SERPL-CCNC: 15 U/L (ref 10–35)
BASOPHILS # BLD AUTO: 41 CELLS/UL (ref 0–200)
BASOPHILS NFR BLD AUTO: 0.5 %
BILIRUB SERPL-MCNC: 0.7 MG/DL (ref 0.2–1.2)
BUN SERPL-MCNC: 16 MG/DL (ref 7–25)
BUN/CREAT SERPL: NORMAL (CALC) (ref 6–22)
CALCIUM SERPL-MCNC: 9.1 MG/DL (ref 8.6–10.3)
CHLORIDE SERPL-SCNC: 102 MMOL/L (ref 98–110)
CHOLEST SERPL-MCNC: 212 MG/DL
CHOLEST/HDLC SERPL: 4.5 (CALC)
CO2 SERPL-SCNC: 28 MMOL/L (ref 20–32)
CREAT SERPL-MCNC: 0.76 MG/DL (ref 0.7–1.25)
EOSINOPHIL # BLD AUTO: 303 CELLS/UL (ref 15–500)
EOSINOPHIL NFR BLD AUTO: 3.7 %
ERYTHROCYTE [DISTWIDTH] IN BLOOD BY AUTOMATED COUNT: 12.7 % (ref 11–15)
GLOBULIN SER CALC-MCNC: 2.7 G/DL (CALC) (ref 1.9–3.7)
GLUCOSE SERPL-MCNC: 86 MG/DL (ref 65–99)
HCT VFR BLD AUTO: 43.9 % (ref 38.5–50)
HDLC SERPL-MCNC: 47 MG/DL
HGB BLD-MCNC: 15.2 G/DL (ref 13.2–17.1)
LDLC SERPL CALC-MCNC: 138 MG/DL (CALC)
LYMPHOCYTES # BLD AUTO: 1788 CELLS/UL (ref 850–3900)
LYMPHOCYTES NFR BLD AUTO: 21.8 %
MCH RBC QN AUTO: 29 PG (ref 27–33)
MCHC RBC AUTO-ENTMCNC: 34.6 G/DL (ref 32–36)
MCV RBC AUTO: 83.8 FL (ref 80–100)
MONOCYTES # BLD AUTO: 730 CELLS/UL (ref 200–950)
MONOCYTES NFR BLD AUTO: 8.9 %
NEUTROPHILS # BLD AUTO: 5338 CELLS/UL (ref 1500–7800)
NEUTROPHILS NFR BLD AUTO: 65.1 %
NONHDLC SERPL-MCNC: 165 MG/DL (CALC)
PLATELET # BLD AUTO: 269 THOUSAND/UL (ref 140–400)
PMV BLD REES-ECKER: 10.8 FL (ref 7.5–12.5)
POTASSIUM SERPL-SCNC: 4.1 MMOL/L (ref 3.5–5.3)
PROT SERPL-MCNC: 6.7 G/DL (ref 6.1–8.1)
RBC # BLD AUTO: 5.24 MILLION/UL (ref 4.2–5.8)
SL AMB EGFR AFRICAN AMERICAN: 108 ML/MIN/1.73M2
SL AMB EGFR NON AFRICAN AMERICAN: 93 ML/MIN/1.73M2
SODIUM SERPL-SCNC: 137 MMOL/L (ref 135–146)
TRIGL SERPL-MCNC: 147 MG/DL
WBC # BLD AUTO: 8.2 THOUSAND/UL (ref 3.8–10.8)

## 2022-01-07 ENCOUNTER — TELEPHONE (OUTPATIENT)
Dept: GASTROENTEROLOGY | Facility: CLINIC | Age: 72
End: 2022-01-07

## 2022-01-07 DIAGNOSIS — K51.00 ULCERATIVE PANCOLITIS WITHOUT COMPLICATION (HCC): Primary | ICD-10-CM

## 2022-01-07 RX ORDER — MESALAMINE 800 MG/1
800 TABLET, DELAYED RELEASE ORAL 3 TIMES DAILY
Qty: 180 TABLET | Refills: 2 | Status: SHIPPED | OUTPATIENT
Start: 2022-01-07 | End: 2022-05-02 | Stop reason: SDUPTHER

## 2022-01-07 NOTE — TELEPHONE ENCOUNTER
Patient called  He said his insurance will no longer cover mesalamine (apriso)  They will cover sulfasalazine or mesalamine DR  Please send alternative   Thank you

## 2022-01-20 ENCOUNTER — VBI (OUTPATIENT)
Dept: ADMINISTRATIVE | Facility: OTHER | Age: 72
End: 2022-01-20

## 2022-01-20 ENCOUNTER — OFFICE VISIT (OUTPATIENT)
Dept: GASTROENTEROLOGY | Facility: CLINIC | Age: 72
End: 2022-01-20
Payer: COMMERCIAL

## 2022-01-20 ENCOUNTER — TELEPHONE (OUTPATIENT)
Dept: GASTROENTEROLOGY | Facility: CLINIC | Age: 72
End: 2022-01-20

## 2022-01-20 VITALS
BODY MASS INDEX: 32.78 KG/M2 | WEIGHT: 229 LBS | DIASTOLIC BLOOD PRESSURE: 98 MMHG | HEIGHT: 70 IN | SYSTOLIC BLOOD PRESSURE: 148 MMHG | HEART RATE: 77 BPM

## 2022-01-20 DIAGNOSIS — D12.6 ADENOMATOUS POLYP OF COLON, UNSPECIFIED PART OF COLON: ICD-10-CM

## 2022-01-20 DIAGNOSIS — K63.89 PROCTOSIGMOIDITIS: Primary | ICD-10-CM

## 2022-01-20 DIAGNOSIS — K51.90 ULCERATIVE COLITIS WITHOUT COMPLICATIONS, UNSPECIFIED LOCATION (HCC): ICD-10-CM

## 2022-01-20 PROCEDURE — 3008F BODY MASS INDEX DOCD: CPT | Performed by: INTERNAL MEDICINE

## 2022-01-20 PROCEDURE — 1160F RVW MEDS BY RX/DR IN RCRD: CPT | Performed by: INTERNAL MEDICINE

## 2022-01-20 PROCEDURE — 3077F SYST BP >= 140 MM HG: CPT | Performed by: INTERNAL MEDICINE

## 2022-01-20 PROCEDURE — 3080F DIAST BP >= 90 MM HG: CPT | Performed by: INTERNAL MEDICINE

## 2022-01-20 PROCEDURE — 1036F TOBACCO NON-USER: CPT | Performed by: INTERNAL MEDICINE

## 2022-01-20 PROCEDURE — 99214 OFFICE O/P EST MOD 30 MIN: CPT | Performed by: INTERNAL MEDICINE

## 2022-01-20 NOTE — PATIENT INSTRUCTIONS
Scheduled date of colonoscopy (as of today): 3/11/22  Physician performing colonoscopy: DR Villaseñor Record  Location of colonoscopy: [facility]  Bowel prep reviewed with patient: MIralax/Dulcolax  Instructions reviewed with patient by: Dahiana  Clearances:  N/A

## 2022-01-20 NOTE — PROGRESS NOTES
[facility] - Outpatient Follow-up Note  Ksenia Polanco 73 y o  male MRN: [id number]  Encounter: [id number]          ASSESSMENT AND PLAN:      1  Proctosigmoiditis  Diagnosed in 2017  Last colonoscopy was   Is doing very well on mesalamine occasional requires Rowasa enemas did have a flare several months ago which has resolved  A lab work CMP CBC in 2021 normal  - Colonoscopy; Future    2  Adenomatous polyp of colon, unspecified part of colon    He is taking multivitamins with folate  2017 adenomas were noted he is due for surveillance colonoscopy  - Colonoscopy; Future    3  Ulcerative colitis without complications, unspecified location [facility])  As above  He will otherwise follow-up in 6 months     ______________________________________________________________________    SUBJECTIVE:  Very pleasant gentleman diagnosed with ulcerative proctosigmoiditis and tubular adenomas in   Did have a colonoscopy in 2019  Recently medications have been changed additionally had a recent flare  Will schedule colonoscopy due to history of inflammatory bowel disease and polyps  He denies any further bleeding  Additionally no upper GI symptoms  REVIEW OF SYSTEMS IS OTHERWISE NEGATIVE  Historical Information   Past Medical History:   Diagnosis Date    Colon polyp     Hypertension     Ulcerative colitis ([address] )      Past Surgical History:   Procedure Laterality Date    COLONOSCOPY      ROTATOR CUFF REPAIR Right      Social History   Social History     Substance and Sexual Activity   Alcohol Use Yes    Comment: social     Social History     Substance and Sexual Activity   Drug Use Never     Social History     Tobacco Use   Smoking Status Former Smoker    Quit date: 10/14/1980    Years since quittin 2   Smokeless Tobacco Never Used     History reviewed  No pertinent family history      Meds/Allergies       Current Outpatient Medications:   benazepril (LOTENSIN) 20 mg tablet    fluticasone (FLONASE) 50 mcg/act nasal spray    hydrochlorothiazide (HYDRODIURIL) 25 mg tablet    mesalamine (ASACOL) 800 MG EC tablet    multivitamin (THERAGRAN) TABS    Red Yeast Rice Extract (RED YEAST RICE PO)    albuterol (5 mg/mL) 0 5 % nebulizer solution    albuterol (PROVENTIL HFA,VENTOLIN HFA) 90 mcg/act inhaler    No Known Allergies        Objective     Blood pressure 148/98, pulse 77, height 5' 10" (1 778 m), weight 104 kg (229 lb)  Body mass index is 32 86 kg/m²  PHYSICAL EXAM:      General Appearance:   Alert, cooperative, no distress   HEENT:   Normocephalic, atraumatic, anicteric      Neck:  Supple, symmetrical, trachea midline   Lungs:   Clear to auscultation bilaterally; no rales, rhonchi or wheezing; respirations unlabored    Heart[de-identified]   Regular rate and rhythm; no murmur, rub, or gallop  Abdomen:   Soft, non-tender, non-distended; normal bowel sounds; no masses, no organomegaly    Genitalia:   Deferred    Rectal:   Deferred    Extremities:  No cyanosis, clubbing or edema    Pulses:  2+ and symmetric    Skin:  No jaundice, rashes, or lesions    Lymph nodes:  No palpable cervical lymphadenopathy        Lab Results:   No visits with results within 1 Day(s) from this visit     Latest known visit with results is:   Orders Only on 11/26/2021   Component Date Value    Total Cholesterol 11/26/2021 212*    HDL 11/26/2021 47     Triglycerides 11/26/2021 147     LDL Calculated 11/26/2021 138*    Chol HDLC Ratio 11/26/2021 4 5     Non-HDL Cholesterol 11/26/2021 165*    Glucose, Random 11/26/2021 86     BUN 11/26/2021 16     Creatinine 11/26/2021 0 76     eGFR Non  11/26/2021 93     eGFR  11/26/2021 108     SL AMB BUN/CREATININE RA* 89/90/5826 NOT APPLICABLE     Sodium 74/46/1424 137     Potassium 11/26/2021 4 1     Chloride 11/26/2021 102     CO2 11/26/2021 28     Calcium 11/26/2021 9 1     Protein, Total 11/26/2021 6 7     Albumin 11/26/2021 4 0     Globulin 11/26/2021 2 7     Albumin/Globulin Ratio 11/26/2021 1 5     TOTAL BILIRUBIN 11/26/2021 0 7     Alkaline Phosphatase 11/26/2021 79     AST 11/26/2021 15     ALT 11/26/2021 20     White Blood Cell Count 11/26/2021 8 2     Red Blood Cell Count 11/26/2021 5 24     Hemoglobin 11/26/2021 15 2     HCT 11/26/2021 43 9     MCV 11/26/2021 83 8     MCH 11/26/2021 29 0     MCHC 11/26/2021 34 6     RDW 11/26/2021 12 7     Platelet Count 01/59/7446 269     SL AMB MPV 11/26/2021 10 8     Neutrophils (Absolute) 11/26/2021 5,338     Lymphocytes (Absolute) 11/26/2021 1,788     Monocytes (Absolute) 11/26/2021 730     Eosinophils (Absolute) 11/26/2021 303     Basophils ABS 11/26/2021 41     Neutrophils 11/26/2021 65 1     Lymphocytes 11/26/2021 21 8     Monocytes 11/26/2021 8 9     Eosinophils 11/26/2021 3 7     Basophils PCT 11/26/2021 0 5          Radiology Results:   No results found

## 2022-03-07 ENCOUNTER — RA CDI HCC (OUTPATIENT)
Dept: OTHER | Facility: HOSPITAL | Age: 72
End: 2022-03-07

## 2022-03-08 ENCOUNTER — TELEPHONE (OUTPATIENT)
Dept: GASTROENTEROLOGY | Facility: CLINIC | Age: 72
End: 2022-03-08

## 2022-03-08 NOTE — TELEPHONE ENCOUNTER
Received call from [address] [place] [address] at [address] [place] [address] Drive pt needs a check list   I lmom for pt to please call back asap to go over health questions (ASC Assessment) needed for his procedure on Friday  Will call pt again tomorrow if do not hear back from him

## 2022-03-09 NOTE — TELEPHONE ENCOUNTER
[facility] Assessment    Name: Ishaan Barber  YOB: 1950  Last Height: 5' 10" (1 778 m)  Last weight: 104 kg (229 lb)  BMI: 32 86 kg/m²  Procedure: Colon  Diagnosis: see order  Date of procedure: 3/11/22  Prep: given at appt - Miralax w/ dul? Responsible : yes  Phone#: [phone]  Name completing form: Madhuri Blas  Date form completed: 03/09/22    If the patient answers yes to any of these questions, schedule in a hospital  Are you pregnant: No  Do you rely on a wheelchair for mobility: No  Have you been diagnosed with End Stage Renal Disease (ESRD): No  Do you need oxygen during the day: No  Have you had a heart attack or stroke within the past three months: No  Have you had a seizure within the past three months: No  Have you ever been informed by anesthesia that you have a difficult airway: No  Additional Questions  Have you had any cardiac testing or are under the care of a Cardiologist (see cardiac list): No  Cardiac list:   Do you have an implanted cardiac defibrillator: No (Comment:  This patient should be scheduled in the hospital)    Have any bleeding problems, such as anemia or hemophilia (If patient has H&H result below 8, schedule in hospital   H&H must be within 30 days of procedure): No    Had an organ transplant within the past 3 months: No    Do you have any present infections: No  Do you get short of breath when walking a few blocks: No  Have you been diagnosed with diabetes: No  Comments (provide cardiac provider information if applicable):

## 2022-03-11 ENCOUNTER — ANESTHESIA EVENT (OUTPATIENT)
Dept: GASTROENTEROLOGY | Facility: AMBULATORY SURGERY CENTER | Age: 72
End: 2022-03-11

## 2022-03-11 ENCOUNTER — ANESTHESIA (OUTPATIENT)
Dept: GASTROENTEROLOGY | Facility: AMBULATORY SURGERY CENTER | Age: 72
End: 2022-03-11

## 2022-03-11 ENCOUNTER — HOSPITAL ENCOUNTER (OUTPATIENT)
Dept: GASTROENTEROLOGY | Facility: AMBULATORY SURGERY CENTER | Age: 72
Discharge: HOME | End: 2022-03-11
Payer: COMMERCIAL

## 2022-03-11 VITALS
RESPIRATION RATE: 18 BRPM | HEIGHT: 70 IN | BODY MASS INDEX: 31.5 KG/M2 | DIASTOLIC BLOOD PRESSURE: 72 MMHG | OXYGEN SATURATION: 95 % | TEMPERATURE: 96 F | WEIGHT: 220 LBS | SYSTOLIC BLOOD PRESSURE: 110 MMHG | HEART RATE: 76 BPM

## 2022-03-11 DIAGNOSIS — K63.89 PROCTOSIGMOIDITIS: ICD-10-CM

## 2022-03-11 DIAGNOSIS — D12.6 ADENOMATOUS POLYP OF COLON, UNSPECIFIED PART OF COLON: ICD-10-CM

## 2022-03-11 PROBLEM — Z86.0101 HX OF ADENOMATOUS COLONIC POLYPS: Status: ACTIVE | Noted: 2022-03-11

## 2022-03-11 PROBLEM — Z86.010 HX OF ADENOMATOUS COLONIC POLYPS: Status: ACTIVE | Noted: 2022-03-11

## 2022-03-11 PROCEDURE — 88342 IMHCHEM/IMCYTCHM 1ST ANTB: CPT | Performed by: PATHOLOGY

## 2022-03-11 PROCEDURE — 00811 ANES LWR INTST NDSC NOS: CPT | Performed by: NURSE ANESTHETIST, CERTIFIED REGISTERED

## 2022-03-11 PROCEDURE — 88341 IMHCHEM/IMCYTCHM EA ADD ANTB: CPT | Performed by: PATHOLOGY

## 2022-03-11 PROCEDURE — 88305 TISSUE EXAM BY PATHOLOGIST: CPT | Performed by: PATHOLOGY

## 2022-03-11 PROCEDURE — 45380 COLONOSCOPY AND BIOPSY: CPT | Performed by: INTERNAL MEDICINE

## 2022-03-11 RX ORDER — PROPOFOL 10 MG/ML
INJECTION, EMULSION INTRAVENOUS AS NEEDED
Status: DISCONTINUED | OUTPATIENT
Start: 2022-03-11 | End: 2022-03-11

## 2022-03-11 RX ORDER — SODIUM CHLORIDE 9 MG/ML
20 INJECTION, SOLUTION INTRAVENOUS CONTINUOUS
Status: DISCONTINUED | OUTPATIENT
Start: 2022-03-11 | End: 2022-03-15 | Stop reason: HOSPADM

## 2022-03-11 RX ORDER — SODIUM CHLORIDE 9 MG/ML
INJECTION, SOLUTION INTRAVENOUS CONTINUOUS PRN
Status: DISCONTINUED | OUTPATIENT
Start: 2022-03-11 | End: 2022-03-11

## 2022-03-11 RX ORDER — SODIUM CHLORIDE 9 MG/ML
30 INJECTION, SOLUTION INTRAVENOUS CONTINUOUS
Status: DISCONTINUED | OUTPATIENT
Start: 2022-03-11 | End: 2022-03-15 | Stop reason: HOSPADM

## 2022-03-11 RX ADMIN — PROPOFOL 50 MG: 10 INJECTION, EMULSION INTRAVENOUS at 09:50

## 2022-03-11 RX ADMIN — PROPOFOL 50 MG: 10 INJECTION, EMULSION INTRAVENOUS at 09:54

## 2022-03-11 RX ADMIN — SODIUM CHLORIDE: 9 INJECTION, SOLUTION INTRAVENOUS at 09:23

## 2022-03-11 RX ADMIN — PROPOFOL 100 MG: 10 INJECTION, EMULSION INTRAVENOUS at 09:44

## 2022-03-11 RX ADMIN — PROPOFOL 50 MG: 10 INJECTION, EMULSION INTRAVENOUS at 09:59

## 2022-03-11 NOTE — H&P
History and Physical - [facility]  Giuseppe Levine 73 y o  male MRN: [id number]                  HPI: Giuseppe Levine is a 73y o  year old male who presents for colonoscopy  He has a history of adenomatous colon polyps last colonoscopy was   The likewise with diagnosed with proctosigmoiditis in 2017  REVIEW OF SYSTEMS: Per the HPI, and otherwise unremarkable  Historical Information   Past Medical History:   Diagnosis Date    Arthritis     Asthma     Colon polyp     GERD (gastroesophageal reflux disease)     Hypertension     Tinnitus     Ulcerative colitis ([address] )      Past Surgical History:   Procedure Laterality Date    COLONOSCOPY      ROTATOR CUFF REPAIR Right      Social History   Social History     Substance and Sexual Activity   Alcohol Use Yes    Comment: rarely     Social History     Substance and Sexual Activity   Drug Use Never     Social History     Tobacco Use   Smoking Status Former Smoker    Packs/day: 1 50    Years: 7 00    Pack years: 10 50    Types: Cigarettes    Quit date: 10/14/1980    Years since quittin 4   Smokeless Tobacco Never Used     History reviewed  No pertinent family history      Meds/Allergies       Current Outpatient Medications:     benazepril (LOTENSIN) 20 mg tablet    fluticasone (FLONASE) 50 mcg/act nasal spray    hydrochlorothiazide (HYDRODIURIL) 25 mg tablet    mesalamine (ASACOL) 800 MG EC tablet    multivitamin (THERAGRAN) TABS    Red Yeast Rice Extract (RED YEAST RICE PO)    albuterol (5 mg/mL) 0 5 % nebulizer solution    albuterol (PROVENTIL HFA,VENTOLIN HFA) 90 mcg/act inhaler    Current Facility-Administered Medications:     sodium chloride 0 9 % infusion, 30 mL/hr, Intravenous, Continuous    Facility-Administered Medications Ordered in Other Encounters:     sodium chloride 0 9 % infusion, , Intravenous, Continuous PRN, New Bag at 22 0923    No Known Allergies    Objective     /78 Pulse 83   Temp (!) 96 °F (35 6 °C) (Temporal)   Resp 18   Ht 5' 10" (1 778 m)   Wt 99 8 kg (220 lb)   SpO2 96%   BMI 31 57 kg/m²       PHYSICAL EXAM    Gen: NAD  Head: NCAT  CV: RRR  CHEST: Clear  ABD: soft, NT/ND  EXT: no edema      ASSESSMENT/PLAN:  This is a 73y o  year old male here for Colonoscopy , and he is stable and optimized for his procedure

## 2022-03-11 NOTE — ANESTHESIA POSTPROCEDURE EVALUATION
Post-Op Assessment Note    CV Status:  Stable  Pain Score: 0    Pain management: adequate     Mental Status:  Alert and awake   Hydration Status:  Euvolemic   PONV Controlled:  Controlled   Airway Patency:  Patent      Post Op Vitals Reviewed: Yes      Staff: CRNA   Comments: vss        No complications documented      BP 93/70 (03/11/22 1005)    Temp     Pulse 88 (03/11/22 1005)   Resp 16 (03/11/22 1005)    SpO2 92 % (03/11/22 1005)

## 2022-03-11 NOTE — ANESTHESIA PREPROCEDURE EVALUATION
Procedure:  COLONOSCOPY    Relevant Problems   CARDIO   (+) Essential hypertension      PULMONARY   (+) Extrinsic asthma without complication        Physical Exam    Airway    Mallampati score: I  TM Distance: >3 FB  Neck ROM: full     Dental   No notable dental hx     Cardiovascular  Rhythm: regular, Rate: normal, Cardiovascular exam normal    Pulmonary  Pulmonary exam normal Breath sounds clear to auscultation,     Other Findings        Anesthesia Plan  ASA Score- 2     Anesthesia Type- IV sedation with anesthesia with ASA Monitors  Additional Monitors:   Airway Plan:           Plan Factors-Exercise tolerance (METS): >4 METS  Chart reviewed  EKG reviewed  Imaging results reviewed  Existing labs reviewed  Patient summary reviewed  Obstructive sleep apnea risk education given perioperatively  Induction- intravenous  Postoperative Plan-     Informed Consent- Anesthetic plan and risks discussed with patient

## 2022-03-11 NOTE — DISCHARGE INSTRUCTIONS
Colonoscopy   WHAT YOU NEED TO KNOW:   A colonoscopy is a procedure to examine the inside of your colon (intestine) with a scope  Polyps or tissue growths may have been removed during your colonoscopy  It is normal to feel bloated and to have some abdominal discomfort  You should be passing gas  If you have hemorrhoids or you had polyps removed, you may have a small amount of bleeding  DISCHARGE INSTRUCTIONS:   Seek care immediately if:    You have sudden, severe abdominal pain   You have problems swallowing   You have a large amount of black, sticky bowel movements or blood in your bowel movements   You have sudden trouble breathing   You feel weak, lightheaded, or faint or your heart beats faster than normal for you  Contact your healthcare provider if:    You have a fever and chills   You have nausea or are vomiting   Your abdomen is bloated or feels full and hard   You have abdominal pain   You have black, sticky bowel movements or blood in your bowel movements   You have not had a bowel movement for 3 days after your procedure   You have rash or hives   You have questions or concerns about your procedure  Activity:    Do not lift, strain, or run for 24 hours after your procedure   Rest after your procedure  You have been given medicine to relax you  Do not drive or make important decisions until the day after your procedure  Return to your normal activity as directed   Relieve gas and discomfort from bloating by lying on your right side with a heating pad on your abdomen  You may need to take short walks to help the gas move out  Eat small meals until bloating is relieved  Follow up with your healthcare provider as directed: Write down your questions so you remember to ask them during your visits  If you take a blood thinner, please review the specific instructions from your endoscopist about when you should resume it   These can be found in the Recommendation and Your Medication list sections of this After Visit Summary  Proctitis   WHAT YOU NEED TO KNOW:   What is proctitis? Proctitis is a condition where you have inflammation of the lining of your rectum  The rectum is the last part of your large intestine that ends at your anus  If the inflammation continues into your colon, it is called proctolitis  Proctitis may be a short-term or long-term condition  What causes proctitis? · Intestinal diseases:  Ulcerative colitis and Crohn disease are autoimmune conditions that may cause proctitis  · Infections:  Intestinal infections or sexually transmitted infections (STIs) may cause proctitis  · Medicines: These include NSAIDs, antibiotics, chemotherapy, and medicine used to clear out your bowel  · Trauma: This can occur from an injury or from putting objects in your rectum  · Radiation treatment:  Radiation to your pelvic area may damage your rectal tissues and blood vessels, causing proctitis  It may occur months or years after your treatment  · Food allergies: These may easily upset any part of your bowel and cause proctitis and proctocolitis  What are the signs and symptoms of proctitis? · Fever, diarrhea, or constipation    · Abdominal pain and bloating    · Swollen groin and rectal lymph nodes    · Pus, mucus, or blood that comes from your rectum or is in your bowel movement    · Rectal and anal itching, pain, or redness    · Feeling that your rectum is not empty after you have a bowel movement or straining to move your bowels    · Not being able to control your bowel movements    How is proctitis diagnosed? Your healthcare provider will examine you and check your abdomen and groin  He may ask about other health conditions, including your past travels or activities  This also includes exposures and contacts, diseases, or treatments you may have had   Your healthcare provider may also check your rectum by inserting a gloved finger into your anus  You may need any of the following tests:  · Lab tests: These may be done to find if your proctitis is caused by bacteria or allergies  A sample of your blood, stool, or discharge may be taken  · Allergy testing: This may be done if your healthcare provider thinks your proctitis is caused by a food allergy  He may do a skin test or have you not eat certain foods  Ask your healthcare provider for more information about allergy testing  · Anoscopy:  During this test, a short tube is carefully put into your anus and up the rectum  This lets healthcare providers look inside your anus and rectum  · Endoscopy:  A long, thin tube with a small camera on the end is put into your anus  Healthcare providers will look for problems in your rectum and colon  A small amount of tissue may be taken from your bowel and sent for tests  How is proctitis treated? Treatment will depend on the cause of the proctitis  Mild proctitis caused by radiation may resolve without treatment  In other cases, treatment may include any of the following:  · Antibiotics: This medicine is given if a bacterial infection is causing your proctitis  Take them as directed  · Antiviral medicine: This medicine is given if a viral infection is causing your proctitis  · Antiinflammatory medicine: This medicine helps prevent swelling  · Antiulcer medicine: This is given as a pill, suppository, or enema to coat the bowel and help prevent further damage to the tissues  It may also help with tissue healing  · Steroids:  Steroid medicine helps decrease swelling  · Surgery:  Surgery may be needed if other treatments have failed  Surgery to remove the damaged part of your bowel may be done  What else may be used to treat proctitis?   If you are bleeding, your healthcare provider may recommend the following:  · Formalin:  This is a chemical solution applied to the walls of your rectum to decrease bleeding  This treatment may be used for radiation proctitis  · Heat therapy: This treatment uses heat to control bleeding and diarrhea caused by radiation  Heat therapy includes laser therapy or argon plasma coagulation (APC)  Ask your healthcare provider for more information on heat therapy  · Hyperbaric oxygen therapy (HBOT):  This is used to increase the oxygen in your body  HBOT may help promote healing of tissues damaged by radiation  What are the risks of proctitis? Some tests or procedures done inside your bowels may cause a perforation (tear) and narrowing  If not treated, proctitis may cause more bleeding or ulcers and scars to form  Scar tissue may lead to narrowing of your rectum  Germs causing your infection may enter your tissues and cause an abscess (collection of pus)  A fistula (abnormal connection) may form from your anus or rectum to your skin or another organ  If you are a woman, a fistula may connect your rectum to your vagina  How can I help treat or prevent proctitis? · Ask about medicines to help ease your symptoms:  If you have constipation, ask about fiber supplements  If you have trouble controlling your bowel, ask about stool-forming medicines  Ask your healthcare provider about a good skin care product to use if the skin around your anus is irritated  · Ask about medicines to help prevent proctitis:  If you are having radiation, these medicines may help prevent you from having proctitis after your therapy  · Practice safe sex:  Do not have sex with someone who has an STI  This includes oral or anal sex  Do not have sex while you or your partner is being treated for a STI  Use new a latex condom or proper barrier each time you have sex  · Get regular check-ups:  Have a regular sexual health check if you often change sexual partners  · Wash your hands often:  Use soap and water  Use gel hand cleanser when there is no soap and water available   This will prevent the spread of germs  Always wash your hands after you use the toilet, when you work with food, and before and after you have sex  Clean your toilet seats, water taps, and door handles often  When should I contact my healthcare provider? · You have bleeding or pain during or after sex  · You have signs and symptoms that are new, do not improve, or get worse  · You have questions or concerns about your condition or care  When should I seek immediate care or call 911? · You have severe abdominal or rectal pain that does not go away  · You have blood, pus, or a foul-smelling discharge coming from your anus or rectum  · You have joint pain, swollen lymph nodes, or night sweats  · You have genital swelling or pain or unusual bleeding  · Your stools are black or have blood on them  CARE AGREEMENT:   You have the right to help plan your care  Learn about your health condition and how it may be treated  Discuss treatment options with your healthcare providers to decide what care you want to receive  You always have the right to refuse treatment  The above information is an  only  It is not intended as medical advice for individual conditions or treatments  Talk to your doctor, nurse or pharmacist before following any medical regimen to see if it is safe and effective for you  © Copyright [employer] 2022 Information is for End User's use only and may not be sold, redistributed or otherwise used for commercial purposes  All illustrations and images included in CareNotes® are the copyrighted property of [employer] M [employer]  or [employer]  Diverticulosis   WHAT YOU NEED TO KNOW:   What is diverticulosis? Diverticulosis is a condition that causes small pockets called diverticula to form in your intestine  These pockets make it difficult for bowel movements to pass through your digestive system  What causes diverticulosis?   Diverticula form when muscles have to work hard to move bowel movements through the intestine  The force causes bulges to form at weak areas in the intestine  This may happen if you eat foods that are low in fiber  Fiber helps give your bowel movements more bulk so they are larger and easier to move through your colon  The following may increase your risk of diverticulosis:  · A history of constipation    · Age 36 or older    · Obesity    · Lack of exercise    What are the signs and symptoms of diverticulosis? Diverticulosis usually does not cause any signs or symptoms  It may cause any of the following in some people:  · Pain or discomfort in your lower abdomen    · Abdominal bloating    · Constipation or diarrhea    How is diverticulosis diagnosed? Your healthcare provider will examine you and ask about your bowel movements, diet, and symptoms  He or she will also ask about any medical conditions you have or medicines you take  You may need any of the following:  · Blood tests  may be done to check for signs of inflammation  · A barium enema  is an x-ray of your colon that may show diverticula  A tube is put into your anus, and a liquid called barium is put through the tube  Barium is used so that healthcare providers can see your colon more clearly  · Flexible sigmoidoscopy  is a test to look for any changes in your lower intestines and rectum  It may also show the cause of any bleeding or pain  A soft, bendable tube with a light on the end will be put into your anus  It will then be moved forward into your intestine  · A colonoscopy  is used to look at your whole colon  A scope (long bendable tube with a light on the end) is used to take pictures  This test may show diverticula  · A CT scan , or CAT scan, may show diverticula  You may be given contrast liquid before the scan  Tell the healthcare provider if you have ever had an allergic reaction to contrast liquid  How is diverticulosis managed?   The goal of treatment is to manage any symptoms you have and prevent other problems such as diverticulitis  Diverticulitis is swelling or infection of the diverticula  Your healthcare provider may recommend any of the following:  · Eat a variety of high-fiber foods  High-fiber foods help you have regular bowel movements  High-fiber foods include cooked beans, fruits, vegetables, and some cereals  Most adults need 25 to 35 grams of fiber each day  Your healthcare provider may recommend that you have more  Ask your healthcare provider how much fiber you need  Increase fiber slowly  You may have abdominal discomfort, bloating, and gas if you add fiber to your diet too quickly  You may need to take a fiber supplement if you are not getting enough fiber from food  · Medicines  to soften your bowel movements may be given  You may also need medicines to treat symptoms such as bloating and pain  · Drink liquids as directed  You may need to drink 2 to 3 liters (8 to 12 cups) of liquids every day  Ask your healthcare provider how much liquid to drink each day and which liquids are best for you  · Apply heat  on your abdomen for 20 to 30 minutes every 2 hours for as many days as directed  Heat helps decrease pain and muscle spasms  How can I help prevent diverticulitis or other symptoms? The following may help decrease your risk for diverticulitis or symptoms, such as bleeding  Talk to your provider about these or other things you can do to prevent problems that may occur with diverticulosis  · Exercise regularly  Ask your healthcare provider about the best exercise plan for you  Exercise can help you have regular bowel movements  Get 30 minutes of exercise on most days of the week  · Maintain a healthy weight  Ask your healthcare provider what a healthy weight is for you  Ask him or her to help you create a weight loss plan if you are overweight  · Do not smoke    Nicotine and other chemicals in cigarettes increase your risk for diverticulitis  Ask your healthcare provider for information if you currently smoke and need help to quit  E-cigarettes or smokeless tobacco still contain nicotine  Talk to your healthcare provider before you use these products  · Ask your healthcare provider if it is safe to take NSAIDs  NSAIDs may increase your risk of diverticulitis  When should I seek immediate care? · You have severe pain on the left side of your lower abdomen  · Your bowel movements are bright or dark red  When should I call my doctor? · You have a fever and chills  · You feel dizzy or lightheaded  · You have nausea, or you are vomiting  · You have a change in your bowel movements  · You have questions or concerns about your condition or care  CARE AGREEMENT:   You have the right to help plan your care  Learn about your health condition and how it may be treated  Discuss treatment options with your healthcare providers to decide what care you want to receive  You always have the right to refuse treatment  The above information is an  only  It is not intended as medical advice for individual conditions or treatments  Talk to your doctor, nurse or pharmacist before following any medical regimen to see if it is safe and effective for you  © Copyright [employer] 2022 Information is for End User's use only and may not be sold, redistributed or otherwise used for commercial purposes  All illustrations and images included in CareNotes® are the copyrighted property of [employer]  or [facility] [employer]   Hemorrhoids   WHAT YOU NEED TO KNOW:   What are hemorrhoids? Hemorrhoids are swollen blood vessels inside your rectum (internal hemorrhoids) or on your anus (external hemorrhoids)  Sometimes a hemorrhoid may prolapse  This means it extends out of your anus  What increases my risk for hemorrhoids?    · Pregnancy or obesity    · Straining or sitting for a long time during bowel movements    · Liver disease    · Weak muscles around the anus caused by older age, rectal surgery, or anal intercourse    · A lack of physical activity    · Chronic diarrhea or constipation    · A low-fiber diet    What are the signs and symptoms of hemorrhoids? · Pain or itching around your anus or inside your rectum    · Swelling or bumps around your anus    · Bright red blood in your bowel movement, on the toilet paper, or in the toilet bowl    · Tissue bulging out of your anus (prolapsed hemorrhoids)    · Incontinence (poor control over urine or bowel movements)    How are hemorrhoids diagnosed? Your healthcare provider will ask about your symptoms, the foods you eat, and your bowel movements  He or she will examine your anus for external hemorrhoids  You may need the following:  · A digital rectal exam  is a test to check for hemorrhoids  Your healthcare provider will put a gloved finger inside your anus to feel for the hemorrhoids  · An anoscopy  is a test that uses a scope (small tube with a light and camera on the end) to look at your hemorrhoids  How are hemorrhoids treated? Treatment will depend on your symptoms  You may need any of the following:  · Medicines  can help decrease pain and swelling, and soften your bowel movement  The medicine may be a pill, pad, cream, or ointment  · Procedures  may be used to shrink or remove your hemorrhoid  Examples include rubber-band ligation, sclerotherapy, and photocoagulation  These procedures may be done in your healthcare provider's office  Ask your healthcare provider for more information about these procedures  · Surgery  may be needed to shrink or remove your hemorrhoids  How can I manage my symptoms? · Apply ice on your anus for 15 to 20 minutes every hour or as directed  Use an ice pack, or put crushed ice in a plastic bag  Cover it with a towel before you apply it to your anus   Ice helps prevent tissue damage and decreases swelling and pain  · Take a sitz bath  Fill a bathtub with 4 to 6 inches of warm water  You may also use a sitz bath pan that fits inside a toilet bowl  Sit in the sitz bath for 15 minutes  Do this 3 times a day, and after each bowel movement  The warm water can help decrease pain and swelling  · Keep your anal area clean  Gently wash the area with warm water daily  Soap may irritate the area  After a bowel movement, wipe with moist towelettes or wet toilet paper  Dry toilet paper can irritate the area  How can I help prevent hemorrhoids? · Do not strain to have a bowel movement  Do not sit on the toilet too long  These actions can increase pressure on the tissues in your rectum and anus  · Drink plenty of liquids  Liquids can help prevent constipation  Ask how much liquid to drink each day and which liquids are best for you  · Eat a variety of high-fiber foods  Examples include fruits, vegetables, and whole grains  Ask your healthcare provider how much fiber you need each day  You may need to take a fiber supplement  · Exercise as directed  Exercise, such as walking, may make it easier to have a bowel movement  Ask your healthcare provider to help you create an exercise plan  · Do not have anal sex  Anal sex can weaken the skin around your rectum and anus  · Avoid heavy lifting  This can cause straining and increase your risk for another hemorrhoid  When should I seek immediate care? · You have severe pain in your rectum or around your anus  · You have severe pain in your abdomen and you are vomiting  · You have bleeding from your anus that soaks through your underwear  When should I contact my healthcare provider? · You have frequent and painful bowel movements  · Your hemorrhoid looks or feels more swollen than usual      · You do not have a bowel movement for 2 days or more  · You see or feel tissue coming through your anus       · You have questions or concerns about your condition or care  CARE AGREEMENT:   You have the right to help plan your care  Learn about your health condition and how it may be treated  Discuss treatment options with your healthcare providers to decide what care you want to receive  You always have the right to refuse treatment  The above information is an  only  It is not intended as medical advice for individual conditions or treatments  Talk to your doctor, nurse or pharmacist before following any medical regimen to see if it is safe and effective for you  © Copyright [employer] 2022 Information is for End User's use only and may not be sold, redistributed or otherwise used for commercial purposes  All illustrations and images included in CareNotes® are the copyrighted property of [employer]  or [facility] Fiber Diet   WHAT YOU NEED TO KNOW:   What is a high-fiber diet? A high-fiber diet includes foods that have a high amount of fiber  Fiber is the part of fruits, vegetables, and grains that is not broken down by your body  Fiber keeps your bowel movements regular  Fiber can also help lower your cholesterol level, control blood sugar in people with diabetes, and relieve constipation  Fiber can also help you control your weight because it helps you feel full faster  Most adults should eat 25 to 35 grams of fiber each day  Talk to your dietitian or healthcare provider about the amount of fiber you need  What foods are good sources of fiber? · Foods with at least 4 grams of fiber per serving:      ? ? to ½ cup of high-fiber cereal (check the nutrition label on the box)    ? ½ cup of blackberries or raspberries    ? 4 dried prunes    ? 1 cooked artichoke    ? ½ cup of cooked legumes, such as lentils, or red, kidney, and pruitt beans    · Foods with 1 to 3 grams of fiber per serving:      ? 1 slice of whole-wheat, pumpernickel, or rye bread    ? ½ cup of cooked brown rice    ?  4 whole-wheat crackers    ? 1 cup of oatmeal    ? ½ cup of cereal with 1 to 3 grams of fiber per serving (check the nutrition label on the box)    ? 1 small piece of fruit, such as an apple, banana, pear, kiwi, or orange    ? 3 dates    ? ½ cup of canned apricots, fruit cocktail, peaches, or pears    ? ½ cup of raw or cooked vegetables, such as carrots, cauliflower, cabbage, spinach, squash, or corn    What are some ways that I can increase fiber in my diet? · Choose brown or wild rice instead of white rice  · Use whole wheat flour in recipes instead of white or all-purpose flour  · Add beans and peas to casseroles or soups  · Choose fresh fruit and vegetables with peels or skins on instead of juices  What other guidelines should I follow? · Add fiber to your diet slowly  You may have abdominal discomfort, bloating, and gas if you add fiber to your diet too quickly  · Drink plenty of liquids as you add fiber to your diet  You may have nausea or develop constipation if you do not drink enough water  Ask how much liquid to drink each day and which liquids are best for you  CARE AGREEMENT:   You have the right to help plan your care  Discuss treatment options with your healthcare provider to decide what care you want to receive  You always have the right to refuse treatment  The above information is an  only  It is not intended as medical advice for individual conditions or treatments  Talk to your doctor, nurse or pharmacist before following any medical regimen to see if it is safe and effective for you  © Copyright [employer] 2022 Information is for End User's use only and may not be sold, redistributed or otherwise used for commercial purposes   All illustrations and images included in CareNotes® are the copyrighted property of A D A M , Inc  or [facility] [employer]

## 2022-03-21 ENCOUNTER — OFFICE VISIT (OUTPATIENT)
Dept: FAMILY MEDICINE CLINIC | Facility: CLINIC | Age: 72
End: 2022-03-21
Payer: COMMERCIAL

## 2022-03-21 VITALS
TEMPERATURE: 96.6 F | BODY MASS INDEX: 32.5 KG/M2 | DIASTOLIC BLOOD PRESSURE: 90 MMHG | WEIGHT: 227 LBS | HEIGHT: 70 IN | SYSTOLIC BLOOD PRESSURE: 140 MMHG | OXYGEN SATURATION: 99 % | HEART RATE: 84 BPM

## 2022-03-21 DIAGNOSIS — G89.29 CHRONIC PAIN OF BOTH KNEES: ICD-10-CM

## 2022-03-21 DIAGNOSIS — M25.561 CHRONIC PAIN OF BOTH KNEES: ICD-10-CM

## 2022-03-21 DIAGNOSIS — I10 ESSENTIAL HYPERTENSION: Primary | ICD-10-CM

## 2022-03-21 DIAGNOSIS — Z11.59 NEED FOR HEPATITIS C SCREENING TEST: ICD-10-CM

## 2022-03-21 DIAGNOSIS — M25.562 CHRONIC PAIN OF BOTH KNEES: ICD-10-CM

## 2022-03-21 DIAGNOSIS — E78.5 HYPERLIPIDEMIA, UNSPECIFIED HYPERLIPIDEMIA TYPE: ICD-10-CM

## 2022-03-21 PROCEDURE — 3725F SCREEN DEPRESSION PERFORMED: CPT | Performed by: FAMILY MEDICINE

## 2022-03-21 PROCEDURE — 99214 OFFICE O/P EST MOD 30 MIN: CPT | Performed by: FAMILY MEDICINE

## 2022-03-21 RX ORDER — BENAZEPRIL HYDROCHLORIDE 20 MG/1
30 TABLET ORAL EVERY MORNING
Qty: 135 TABLET | Refills: 1 | Status: SHIPPED | OUTPATIENT
Start: 2022-03-21 | End: 2022-07-22

## 2022-03-21 NOTE — PROGRESS NOTES
Assessment/Plan:    No problem-specific Assessment & Plan notes found for this encounter  Diagnoses and all orders for this visit:    Essential hypertension  -     Basic metabolic panel; Future  -     benazepril (LOTENSIN) 20 mg tablet; Take 1 5 tablets (30 mg total) by mouth every morning    Hyperlipidemia, unspecified hyperlipidemia type  -     Lipid Panel with Direct LDL reflex; Future    Chronic pain of both knees  -     Ambulatory Referral to Orthopedic Surgery; Future      Increase Benazepril to 30 mg daily  Subjective:      Patient ID: Shala Cesar is a 73 y o  male  HPI     Patient presents to the office for BP follow up  States that he has been compliant with her BP medications  Denies any issues or concerns  Reports that he has B/L knee pain that seems to be worse at night  Notes that he last had injections in his knees about 5 years ago  States that he has had the gel injections previously  He is not interested in cortisone shots  He was previously told that he needs a replacement  The following portions of the patient's history were reviewed and updated as appropriate: allergies, current medications, past family history, past medical history, past social history, past surgical history and problem list     Review of Systems   Constitutional: Negative for chills, fatigue and fever  HENT: Negative for congestion, ear pain, rhinorrhea, sinus pain and sore throat  Eyes: Negative for pain  Respiratory: Negative for cough and shortness of breath  Cardiovascular: Negative for chest pain and leg swelling  Gastrointestinal: Negative for abdominal pain, constipation, diarrhea, nausea and vomiting  Genitourinary: Negative for difficulty urinating, dysuria, frequency and urgency  Musculoskeletal: Positive for arthralgias  Negative for neck pain  Skin: Negative for rash  Neurological: Negative for dizziness and headaches     Psychiatric/Behavioral: Negative for sleep disturbance  Objective:  /90 (BP Location: Left arm, Patient Position: Sitting, Cuff Size: Large)   Pulse 84   Temp (!) 96 6 °F (35 9 °C)   Ht 5' 10" (1 778 m)   Wt 103 kg (227 lb)   SpO2 99%   BMI 32 57 kg/m²      Physical Exam  Vitals reviewed  Constitutional:       General: He is not in acute distress  Appearance: Normal appearance  HENT:      Head: Normocephalic and atraumatic  Right Ear: External ear normal       Left Ear: External ear normal       Nose: Nose normal       Mouth/Throat:      Mouth: Mucous membranes are moist    Eyes:      Extraocular Movements: Extraocular movements intact  Conjunctiva/sclera: Conjunctivae normal       Pupils: Pupils are equal, round, and reactive to light  Cardiovascular:      Rate and Rhythm: Normal rate and regular rhythm  Heart sounds: Normal heart sounds  Pulmonary:      Breath sounds: Normal breath sounds  Abdominal:      General: Abdomen is flat  Bowel sounds are normal  There is no distension  Palpations: Abdomen is soft  There is no mass  Tenderness: There is no abdominal tenderness  There is no guarding  Musculoskeletal:      Right knee: Crepitus present  Left knee: Crepitus present  Right lower leg: No edema  Left lower leg: No edema  Skin:     General: Skin is warm  Capillary Refill: Capillary refill takes less than 2 seconds  Neurological:      Mental Status: He is alert  Mental status is at baseline             Joe Hoffmann DO  [facility]  3/21/2022 8:20 AM

## 2022-03-25 DIAGNOSIS — M25.562 CHRONIC PAIN OF LEFT KNEE: Primary | ICD-10-CM

## 2022-03-25 DIAGNOSIS — M25.561 CHRONIC PAIN OF RIGHT KNEE: ICD-10-CM

## 2022-03-25 DIAGNOSIS — G89.29 CHRONIC PAIN OF LEFT KNEE: Primary | ICD-10-CM

## 2022-03-25 DIAGNOSIS — G89.29 CHRONIC PAIN OF RIGHT KNEE: ICD-10-CM

## 2022-03-29 ENCOUNTER — OFFICE VISIT (OUTPATIENT)
Dept: OBGYN CLINIC | Facility: CLINIC | Age: 72
End: 2022-03-29
Payer: COMMERCIAL

## 2022-03-29 ENCOUNTER — APPOINTMENT (OUTPATIENT)
Dept: RADIOLOGY | Facility: CLINIC | Age: 72
End: 2022-03-29
Payer: COMMERCIAL

## 2022-03-29 VITALS
HEART RATE: 70 BPM | HEIGHT: 70 IN | WEIGHT: 227 LBS | SYSTOLIC BLOOD PRESSURE: 184 MMHG | DIASTOLIC BLOOD PRESSURE: 109 MMHG | BODY MASS INDEX: 32.5 KG/M2

## 2022-03-29 DIAGNOSIS — G89.29 CHRONIC PAIN OF BOTH KNEES: ICD-10-CM

## 2022-03-29 DIAGNOSIS — M25.561 CHRONIC PAIN OF BOTH KNEES: ICD-10-CM

## 2022-03-29 DIAGNOSIS — G89.29 CHRONIC PAIN OF RIGHT KNEE: ICD-10-CM

## 2022-03-29 DIAGNOSIS — M25.561 CHRONIC PAIN OF RIGHT KNEE: ICD-10-CM

## 2022-03-29 DIAGNOSIS — M17.0 PRIMARY OSTEOARTHRITIS OF BOTH KNEES: Primary | ICD-10-CM

## 2022-03-29 DIAGNOSIS — M25.562 CHRONIC PAIN OF BOTH KNEES: ICD-10-CM

## 2022-03-29 DIAGNOSIS — G89.29 CHRONIC PAIN OF LEFT KNEE: ICD-10-CM

## 2022-03-29 DIAGNOSIS — M25.562 CHRONIC PAIN OF LEFT KNEE: ICD-10-CM

## 2022-03-29 PROCEDURE — 99203 OFFICE O/P NEW LOW 30 MIN: CPT | Performed by: ORTHOPAEDIC SURGERY

## 2022-03-29 PROCEDURE — 3008F BODY MASS INDEX DOCD: CPT | Performed by: ORTHOPAEDIC SURGERY

## 2022-03-29 PROCEDURE — 3077F SYST BP >= 140 MM HG: CPT | Performed by: ORTHOPAEDIC SURGERY

## 2022-03-29 PROCEDURE — 3080F DIAST BP >= 90 MM HG: CPT | Performed by: ORTHOPAEDIC SURGERY

## 2022-03-29 PROCEDURE — 73562 X-RAY EXAM OF KNEE 3: CPT

## 2022-03-29 PROCEDURE — 1160F RVW MEDS BY RX/DR IN RCRD: CPT | Performed by: ORTHOPAEDIC SURGERY

## 2022-03-29 PROCEDURE — 1036F TOBACCO NON-USER: CPT | Performed by: ORTHOPAEDIC SURGERY

## 2022-03-29 NOTE — PROGRESS NOTES
Orthopaedics Office Visit - 1st Patient Visit    ASSESSMENT/PLAN:    Assessment:   Bilateral knee pain   Severe osteoarthritis bilateral knees      Plan:   - Discussed conservative treatment and surgical intervention with patient at length  - offered patient cortisone injection to the knees  Patient declined opting for discussion of surgical intervention   - Over the counter analgesics as needed / directed   - Ice / heat as directed   - referral placed to Dr Aura Storm for discussion of L TKA       To Do Next Visit:  Evaluate knee pain     _____________________________________________________  CHIEF COMPLAINT:  Chief Complaint   Patient presents with    Right Knee - Pain    Left Knee - Pain         SUBJECTIVE:  Jamin Brown is a 73 y o  male who presents office for initial evaluation of bilateral knee pain  Patient states that his symptoms have been ongoing for the past 10 years in duration with no injury of note  Patient states that his pain is predominantly in the anterior aspect of the knees becomes worse with stairs and prolonged weight-bearing  Patient states he has a known case of osteoarthritis of the knees which has been conservatively treated with 3 courses of viscosupplementation  Patient states that his last course of viscosupplementation provided him with months of relief  Patient states he has not been taking any type of medication by mouth for the knee pain  Patient does admit to having a history of a patella fracture when he was 15years old on the left knee  Patient offers no other complaints at this time  Patient last had treatment for his knees approximately 5 years ago         PAST MEDICAL HISTORY:  Past Medical History:   Diagnosis Date    Arthritis     Asthma     Colon polyp     GERD (gastroesophageal reflux disease)     Hypertension     Tinnitus     Ulcerative colitis ([facility] [address] )        PAST SURGICAL HISTORY:  Past Surgical History:   Procedure Laterality Date    COLONOSCOPY  ROTATOR CUFF REPAIR Right 1991       FAMILY HISTORY:  No family history on file  SOCIAL HISTORY:  Social History     Tobacco Use    Smoking status: Former Smoker     Packs/day: 1 50     Years: 7 00     Pack years: 10 50     Types: Cigarettes     Quit date: 10/14/1980     Years since quittin 4    Smokeless tobacco: Never Used   Vaping Use    Vaping Use: Never used   Substance Use Topics    Alcohol use: Yes     Comment: rarely    Drug use: Never       MEDICATIONS:    Current Outpatient Medications:     albuterol (5 mg/mL) 0 5 % nebulizer solution, Take 0 5 mL (2 5 mg total) by nebulization every 6 (six) hours as needed for wheezing, Disp: 180 mL, Rfl: 2    albuterol (PROVENTIL HFA,VENTOLIN HFA) 90 mcg/act inhaler, Inhale 2 puffs every 6 (six) hours as needed for wheezing  , Disp: , Rfl:     benazepril (LOTENSIN) 20 mg tablet, Take 1 5 tablets (30 mg total) by mouth every morning, Disp: 135 tablet, Rfl: 1    fluticasone (FLONASE) 50 mcg/act nasal spray, SPRAY 1 SPRAY INTO EACH NOSTRIL EVERY DAY, Disp: 48 mL, Rfl: 0    hydrochlorothiazide (HYDRODIURIL) 25 mg tablet, Take 1 tablet (25 mg total) by mouth daily, Disp: 90 tablet, Rfl: 1    mesalamine (ASACOL) 800 MG EC tablet, Take 1 tablet (800 mg total) by mouth 3 (three) times a day (Patient taking differently: Take 1,600 mg by mouth 2 (two) times a day  ), Disp: 180 tablet, Rfl: 2    multivitamin (THERAGRAN) TABS, Take 1 tablet by mouth daily, Disp: , Rfl:     Red Yeast Rice Extract (RED YEAST RICE PO), Take by mouth, Disp: , Rfl:     ALLERGIES:  No Known Allergies    REVIEW OF SYSTEMS:  MSK: bilateral knee pain   Neuro: WNL   Pertinent items are otherwise noted in HPI  A comprehensive review of systems was otherwise negative      LABS:  HgA1c: No results found for: HGBA1C  BMP:   Lab Results   Component Value Date    CALCIUM 9 1 2021    K 4 1 2021    CO2 28 2021     2021    BUN 16 2021    CREATININE 0 76 11/26/2021     CBC: No components found for: CBC    _____________________________________________________  PHYSICAL EXAMINATION:  Vital signs: BP (!) 184/109   Pulse 70   Ht 5' 10" (1 778 m)   Wt 103 kg (227 lb)   BMI 32 57 kg/m²   General: No acute distress, awake and alert  Psychiatric: Mood and affect appear appropriate  HEENT: Trachea Midline, No torticollis, no apparent facial trauma  Cardiovascular: No audible murmurs; Extremities appear perfused  Pulmonary: No audible wheezing or stridor  Skin: No open lesions; see further details (if any) below      MUSCULOSKELETAL EXAMINATION:  Bilateral knee examination:  - Patient sitting comfortably in the office in no acute distress   - genu varum deformity present on bilateral knee pain  No effusion present  - mild tenderness palpation over the medial joint line  No other bony or soft tissue tenderness palpation noted at this time  - 0-100 degrees range of motion noted bilaterally  - Special Tests       -  medial space does gap with valgus stress test   - NV intact      _____________________________________________________  STUDIES REVIEWED:  I personally reviewed the images and interpretation is as follows:  Right knee x-ray three views  Moderate medial and patellofemoral joint space narrowing with osteophyte formation present  Left knee x-ray three views  Severe medial joint space narrowing with moderate patellofemoral joint space narrowing noted        PROCEDURES PERFORMED:  No procedures were performed at this time  Ayesha Palma PA-C - assisting    Edgar Retana MD            Portions of the record may have been created with voice recognition software  Occasional wrong word or "sound a like" substitutions may have occurred due to the inherent limitations of voice recognition software  Read the chart carefully and recognize, using context, where substitutions have occurred

## 2022-03-29 NOTE — PATIENT INSTRUCTIONS
- Discussed conservative treatment and surgical intervention with patient at length  - offered patient cortisone injection to the knees    Patient declined opting for discussion of surgical intervention   - Over the counter analgesics as needed / directed   - Ice / heat as directed   - referral placed to Dr April Olsen for discussion of TKA

## 2022-04-04 ENCOUNTER — TELEPHONE (OUTPATIENT)
Dept: FAMILY MEDICINE CLINIC | Facility: CLINIC | Age: 72
End: 2022-04-04

## 2022-04-04 NOTE — TELEPHONE ENCOUNTER
T/c from pt -- called to report that his bp has been very high recently, since his bp medication change -- reports bp has been checked at home and in his ortho's office -- 165/106, 164/104, 159/107, 156/102, 173/111 -- also reports that he feels fine  Would like Dr Ramila Burleson to know  Please advise

## 2022-04-04 NOTE — TELEPHONE ENCOUNTER
Patient should be taking 30 mg of Benazepril and 25 mg of HCTZ daily  Please clarify if this is what the patient is taking  If he is, then he needs ot increase his benazapril to 40 mg daily (2 tablets of his 20 mg daily)       Marisa Blood [facility]  4/4/2022 12:36 PM

## 2022-04-04 NOTE — TELEPHONE ENCOUNTER
Gave pt Dr Huma Magaña advisement -- verified pt does take the medication as described by the doctor  Understands he should change his dose  Also wanted to report that he would be ok with the foot swelling that he was getting from the amlodipine, if it means his bp will go down    In the meantime, he will change his dose as directed

## 2022-04-06 NOTE — TELEPHONE ENCOUNTER
Spoke with patient and advisement given, expressed understanding  Will follow up with us with readings

## 2022-04-06 NOTE — TELEPHONE ENCOUNTER
Patient should continue to record BP and we can discuss further medication changes if need be       Jessica Bowie DO  [facility]  4/6/2022 4:21 PM

## 2022-04-11 ENCOUNTER — APPOINTMENT (OUTPATIENT)
Dept: RADIOLOGY | Facility: CLINIC | Age: 72
End: 2022-04-11
Payer: COMMERCIAL

## 2022-04-11 ENCOUNTER — OFFICE VISIT (OUTPATIENT)
Dept: OBGYN CLINIC | Facility: CLINIC | Age: 72
End: 2022-04-11
Payer: COMMERCIAL

## 2022-04-11 VITALS
WEIGHT: 225.4 LBS | HEIGHT: 71 IN | HEART RATE: 65 BPM | BODY MASS INDEX: 31.56 KG/M2 | SYSTOLIC BLOOD PRESSURE: 154 MMHG | DIASTOLIC BLOOD PRESSURE: 91 MMHG

## 2022-04-11 DIAGNOSIS — M25.561 CHRONIC PAIN OF BOTH KNEES: Primary | ICD-10-CM

## 2022-04-11 DIAGNOSIS — G89.29 CHRONIC PAIN OF BOTH KNEES: Primary | ICD-10-CM

## 2022-04-11 DIAGNOSIS — M17.0 PRIMARY OSTEOARTHRITIS OF BOTH KNEES: ICD-10-CM

## 2022-04-11 DIAGNOSIS — M25.562 CHRONIC PAIN OF BOTH KNEES: ICD-10-CM

## 2022-04-11 DIAGNOSIS — M25.561 CHRONIC PAIN OF BOTH KNEES: ICD-10-CM

## 2022-04-11 DIAGNOSIS — M17.12 PRIMARY OSTEOARTHRITIS OF LEFT KNEE: ICD-10-CM

## 2022-04-11 DIAGNOSIS — M25.562 CHRONIC PAIN OF BOTH KNEES: Primary | ICD-10-CM

## 2022-04-11 DIAGNOSIS — G89.29 CHRONIC PAIN OF BOTH KNEES: ICD-10-CM

## 2022-04-11 PROCEDURE — 3077F SYST BP >= 140 MM HG: CPT | Performed by: ORTHOPAEDIC SURGERY

## 2022-04-11 PROCEDURE — 3008F BODY MASS INDEX DOCD: CPT | Performed by: ORTHOPAEDIC SURGERY

## 2022-04-11 PROCEDURE — 1160F RVW MEDS BY RX/DR IN RCRD: CPT | Performed by: ORTHOPAEDIC SURGERY

## 2022-04-11 PROCEDURE — 99214 OFFICE O/P EST MOD 30 MIN: CPT | Performed by: ORTHOPAEDIC SURGERY

## 2022-04-11 PROCEDURE — 73560 X-RAY EXAM OF KNEE 1 OR 2: CPT

## 2022-04-11 PROCEDURE — 1036F TOBACCO NON-USER: CPT | Performed by: ORTHOPAEDIC SURGERY

## 2022-04-11 PROCEDURE — 3080F DIAST BP >= 90 MM HG: CPT | Performed by: ORTHOPAEDIC SURGERY

## 2022-04-11 NOTE — PROGRESS NOTES
Patient Name:  Karly Lake  MRN:  [id number]    Chelsea Mehta     1  Chronic pain of both knees  -     XR knee 1 or 2 vw right; Future; Expected date: 04/11/2022  -     XR knee 1 or 2 vw left; Future; Expected date: 04/11/2022    2  Primary osteoarthritis of both knees  -     Ambulatory referral to Orthopedic Surgery        73 y o  male with Left knee osteoarthritis  X-rays reviewed in office today with patient  In depth discussion had with patient regarding continued conservative management of Left knee osteoarthritis including OTC oral analgesics, topical analgesics, formal outpatient physical therapy for strengthening of quads, hamstrings, hip abductors, ice application, medial  bracing, corticosteroid injection, viscosupplementation injections  Also discussed surgical intervention by means of total knee arthroplasty  Patient verbalized understanding of the above and would like to proceed forward with medial  bracing and outpatient PT  Patient was fitted in office with brace  He will follow up in office in 8-10 weeks for reevaluation of Left knee  Chief Complaint     Left knee pain    History of the Present Illness     Karly Lake is a 73 y o  male with Left knee pain ongoing for many years  Patient admits to history of viscosupplementation injections which lasted approximately 4 years  He had repeat injections performed with decreasing efficacy  He stays away from cortisone unless his knee is very irritated  He recently had appointment with Dr Roman Garcia whom offered corticosteroid injection, but patient declined as he wished to move forward with surgical intervention  He admits to moderate pain with long distance ambulation, prolonged sitting in a car and then standing  Pain is mostly over the medial knee  He is not able to administer NSAIDs secondary to history of colitis  He admits he used to run for exercise, but secondary to pain and weakness, he has decreased running  He states he was told by someone in [place] that "my knee could snap because of the bending"  He admits obvious deformity of Left knee is his biggest complaint today  Review of Systems     Review of Systems   Constitutional: Negative for chills and fever  HENT: Negative for ear pain and sore throat  Eyes: Negative for pain and visual disturbance  Respiratory: Negative for cough and shortness of breath  Cardiovascular: Negative for chest pain and palpitations  Gastrointestinal: Negative for abdominal pain and vomiting  Genitourinary: Negative for dysuria and hematuria  Musculoskeletal: Negative for arthralgias and back pain  Skin: Negative for color change and rash  Neurological: Negative for seizures and syncope  All other systems reviewed and are negative  Physical Exam     /91   Pulse 65   Ht 5' 10 5" (1 791 m)   Wt 102 kg (225 lb 6 4 oz)   BMI 31 88 kg/m²     Left Knee  Obvious varus deformity of Left knee  Range of motion from 0 to 125-130  There is crepitus with range of motion  There is minimal effusion  There is tenderness over the medial and posteromedial joint line  There is 5/5 quadriceps strength and preserved tone  The patient is ableperform a straight leg raise  positive  patellar grind test   Anterior drawer tests is negative  negative Lachman Test    Posterior drawer test is   negative   Varus stress testing reveals no pain or laxity at 0 and 30 degrees   Valgus stress testing reveals no pain or laxity at 0 and 30 degrees  The patient is neurovascular intact distally  Eyes:  Anicteric sclerae  Neck:  Supple  Lungs:  Normal respiratory effort  Cardiovascular:  Capillary refill is less than 2 seconds  Skin:  Intact without erythema  Neurologic:  Sensation grossly intact to light touch  Psychiatric:  Mood and affect are appropriate      Data Review     I have personally reviewed pertinent films in PACS, and my interpretation follows:    X-rays taken 2022 of Left knee demonstrates moderate to severe medial compartment degenerative changes and osteophytes  Past Medical History:   Diagnosis Date    Arthritis     Asthma     Colon polyp     GERD (gastroesophageal reflux disease)     Hypertension     Tinnitus     Ulcerative colitis ([address] )        Past Surgical History:   Procedure Laterality Date    COLONOSCOPY      ROTATOR CUFF REPAIR Right        No Known Allergies    Current Outpatient Medications on File Prior to Visit   Medication Sig Dispense Refill    albuterol (5 mg/mL) 0 5 % nebulizer solution Take 0 5 mL (2 5 mg total) by nebulization every 6 (six) hours as needed for wheezing 180 mL 2    albuterol (PROVENTIL HFA,VENTOLIN HFA) 90 mcg/act inhaler Inhale 2 puffs every 6 (six) hours as needed for wheezing        benazepril (LOTENSIN) 20 mg tablet Take 1 5 tablets (30 mg total) by mouth every morning 135 tablet 1    fluticasone (FLONASE) 50 mcg/act nasal spray SPRAY 1 SPRAY INTO EACH NOSTRIL EVERY DAY 48 mL 0    hydrochlorothiazide (HYDRODIURIL) 25 mg tablet Take 1 tablet (25 mg total) by mouth daily 90 tablet 1    mesalamine (ASACOL) 800 MG EC tablet Take 1 tablet (800 mg total) by mouth 3 (three) times a day (Patient taking differently: Take 1,600 mg by mouth 2 (two) times a day  ) 180 tablet 2    multivitamin (THERAGRAN) TABS Take 1 tablet by mouth daily      Red Yeast Rice Extract (RED YEAST RICE PO) Take by mouth       No current facility-administered medications on file prior to visit  Social History     Tobacco Use    Smoking status: Former Smoker     Packs/day: 1 50     Years: 7 00     Pack years: 10 50     Types: Cigarettes     Quit date: 10/14/1980     Years since quittin 5    Smokeless tobacco: Never Used   Vaping Use    Vaping Use: Never used   Substance Use Topics    Alcohol use: Yes     Comment: rarely    Drug use: Never       History reviewed   No pertinent family history            Procedures Performed     Procedures  None       Char Gipson PA-C

## 2022-04-21 ENCOUNTER — TELEPHONE (OUTPATIENT)
Dept: GASTROENTEROLOGY | Facility: CLINIC | Age: 72
End: 2022-04-21

## 2022-04-21 NOTE — TELEPHONE ENCOUNTER
Patient is asking if the prior dosing of Mesalamine 1 tablet 3 times a day is ok to do instead of the dosing of 2 tablets twice daily which is what the patient is doing currently

## 2022-04-22 ENCOUNTER — EVALUATION (OUTPATIENT)
Dept: PHYSICAL THERAPY | Facility: CLINIC | Age: 72
End: 2022-04-22
Payer: COMMERCIAL

## 2022-04-22 DIAGNOSIS — M17.12 PRIMARY OSTEOARTHRITIS OF LEFT KNEE: ICD-10-CM

## 2022-04-22 PROCEDURE — 97161 PT EVAL LOW COMPLEX 20 MIN: CPT | Performed by: PHYSICAL THERAPIST

## 2022-04-22 PROCEDURE — 97112 NEUROMUSCULAR REEDUCATION: CPT | Performed by: PHYSICAL THERAPIST

## 2022-04-22 PROCEDURE — 97110 THERAPEUTIC EXERCISES: CPT | Performed by: PHYSICAL THERAPIST

## 2022-04-22 NOTE — PROGRESS NOTES
PT Evaluation     Today's date: 2022  Patient name: Marifer Cisneros  : 1950  MRN: [id number]  Referring provider: Sarah Gaitan DO  Dx:   Encounter Diagnosis     ICD-10-CM    1  Primary osteoarthritis of left knee  M17 12 Ambulatory Referral to Physical Therapy       Start Time: 935  Stop Time: 1020  Total time in clinic (min): 45 minutes    Assessment  Assessment details: Patient is a 73 y o  male who presents to physical therapy with c/o left knee pain consistent with osteoarthritis  Patient presents to evaluation with pain, decreased range of motion, decreased strength, and decreased tolerance to activity  Patient demonstrates good tolerance to treatment and was provided with a written copy of their initial home exercise program focusing on knee/hip strength and single leg stability - pt was encouraged to perform daily per tolerance  I discussed risks, benefits, and alternatives to treatment, and answered all patient questions to patient satisfaction  Patient presents with baseline FOTO score of 68 indicating limited tolerance/ability to complete ADLs  Patient is an appropriate candidate for skilled PT and would benefit from skilled PT services to address the aforementioned impairments, achieve goals, maximize function, and improve quality of life  Pt is in agreement with this plan      Patient Education: activity modifications as needed, pacing of activities, importance of HEP compliance, PT prognosis/POC    Impairments: abnormal or restricted ROM, activity intolerance, impaired balance, impaired physical strength, lacks appropriate home exercise program, pain with function and weight-bearing intolerance  Understanding of Dx/Px/POC: good   Prognosis: good    Goals  ST weeks  Pt will demonstrate good understanding and compliance with HEP   Pt will decrease left knee pain to 3-4/10     LT weeks  Pt will decrease left knee pain to 0-1/10   Pt will increase left hip/knee strength to 5-/5 to allow for improved ability to squat, negotiate stairs, and prolonged community ambulation   Pt will improve FOTO score to > or = to 70 to indicate improved functional abilities   Pt will ambulate with least restrictive AD and normal gait mechanics   Pt will demonstrate negative step down test to indicate good dynamic knee control        Plan  Plan details: 1-2x/wk  Patient would benefit from: PT eval and skilled physical therapy  Planned modality interventions: cryotherapy and thermotherapy: hydrocollator packs  Planned therapy interventions: ADL training, manual therapy, neuromuscular re-education, patient education, postural training, self care, strengthening, stretching, therapeutic activities, therapeutic exercise, home exercise program, flexibility, functional ROM exercises, graded activity, graded exercise, body mechanics training and balance  Frequency: 2x week  Duration in weeks: 8  Plan of Care beginning date: 2022  Plan of Care expiration date: 2022  Treatment plan discussed with: patient        Subjective Evaluation    History of Present Illness  Mechanism of injury: Pt reports to PT with c/o chronic left knee pain that started on 15 years ago and has been episodic in nature but feels most recent course of pain started a few months ago  Pt has a lot of pain and deformity of his knee and recently saw Dr Jesusita Moyer who referred him to PT gave him a medial  brace to help with his arthritis  Pt denies buckling/locking episodes  Pt denies swelling or N/T into extremities  Pt hasn't had formal PT treatment for knee to this point but has series of both cortisone and viscosupplementation injections with some relief       Aggravating factors: all WB activities, knee movements    Easing Factors:   brace, rest    PLOF:  Hx of knee pain for past 15 years    PMH: HTN (controlled w/ meds)  Pain  Current pain ratin  At best pain ratin  At worst pain ratin  Quality: dull ache and sharp    Treatments  Previous treatment: injection treatment  Patient Goals  Patient goals for therapy: decreased pain, increased motion, return to sport/leisure activities, independence with ADLs/IADLs, increased strength and improved balance          Objective     General Comments:      Knee Comments  Left knee AROM: 0-135*    Left knee MMT: Flexion 5-/5 Extension 5-/5    Left hip MMT: Flex 4+/5 Abd 4+/5 Extension 4+/5    Gait Assessment: Pt ambulates unassisted with normal gait mechanics, notable varus deformity on left    Palpation: minor TTP along medial/lateral joint lines    Patellar mobility WNL    Varus stress: (-)  Valgus stress: (-)  Ant Drawer: (-)  Lachmans: (-)  Patellar Compression: (+)  Patellar Apprehension: (-)  Apley: (-)  90/90 HS flexibility: (10*)  Elys: (-)    SLS on level surface: 30" with moderate ankle/hip strategy     Functional squat assessment: narrow stance with good depth, slight knee pain     Stairs reveal poor eccentric control     FOTO: 68             Diagnosis: L knee pain   Precautions: HTN (controlled w/ meds)   POC Expires: 6/17/22   Re-evaluation Date: 5/20/22   FOTO Scores/Date: Goal - 70 ; 4/22/22 - 68   Visit Count 1/10       Manuals 4/22                                               Ther Ex 4/22       Lateral band walk grn 4 laps       Monster walk grn 4 laps       Leg press 95# 2x10       Step ups 8" 10x ea fwd/lat                               HEP Creation/instruction       Neuro Re-Ed 4/22       SLS  W/ 5# 10x       SL cone  3 cones to low mat; 3x       Standing hip 3-way grn 20x ea bilateral                                                                                              Ther Act                                         Modalities

## 2022-04-25 DIAGNOSIS — I10 ESSENTIAL HYPERTENSION: ICD-10-CM

## 2022-04-25 DIAGNOSIS — R60.0 BILATERAL LOWER EXTREMITY EDEMA: ICD-10-CM

## 2022-04-25 RX ORDER — HYDROCHLOROTHIAZIDE 25 MG/1
25 TABLET ORAL DAILY
Qty: 90 TABLET | Refills: 1 | Status: SHIPPED | OUTPATIENT
Start: 2022-04-25 | End: 2022-06-21

## 2022-04-26 ENCOUNTER — TELEPHONE (OUTPATIENT)
Dept: GASTROENTEROLOGY | Facility: CLINIC | Age: 72
End: 2022-04-26

## 2022-04-26 NOTE — TELEPHONE ENCOUNTER
Pt is currently taking mesalamine 1600 mg bid  He states he is doing well with no symptoms  His apt with you is in June  He is asking if he should continue this dose

## 2022-04-27 ENCOUNTER — OFFICE VISIT (OUTPATIENT)
Dept: PHYSICAL THERAPY | Facility: CLINIC | Age: 72
End: 2022-04-27
Payer: COMMERCIAL

## 2022-04-27 DIAGNOSIS — M17.12 PRIMARY OSTEOARTHRITIS OF LEFT KNEE: Primary | ICD-10-CM

## 2022-04-27 PROCEDURE — 97110 THERAPEUTIC EXERCISES: CPT | Performed by: PHYSICAL THERAPIST

## 2022-04-27 PROCEDURE — 97112 NEUROMUSCULAR REEDUCATION: CPT | Performed by: PHYSICAL THERAPIST

## 2022-04-27 NOTE — PROGRESS NOTES
Daily Note     Today's date: 2022  Patient name: Richard Norris  : 1950  MRN: [id number]  Referring provider: Jad Simon DO  Dx:   Encounter Diagnosis     ICD-10-CM    1  Primary osteoarthritis of left knee  M17 12        Start Time: 845  Stop Time: 935  Total time in clinic (min): 50 minutes    Subjective: Pt reports feeling good after last visit without any increase in pain, lost his home exercise print out so hasn't been able to complete and requests another print out of exercises today  Objective: See treatment diary below      Assessment: Pt had lost previous print out of home exercises and was issued new print out of exercises to allow him to begin with exercises at home  Pt demonstrates good tolerance to progression of closed chain strength/stability but remains reliant on cues for correction of knee mechanics and exercise form/sequencing  Introduced static lunges with TRX assist and limited depth to avoid painful end ranges bilaterally with good tolerance and initial heavy reliance on UE assist but able to gradually wean from this with repetition  Added resisted walking 4-way to further challenge dynamic stability and strength with good tolerance but cues for slow controlled motion, especially during eccentric return as he tends to perform quickly with poor control but improved following cues  Due to high co-pay, pt will continue with PT 1x/wk with emphasis on regular HEP compliance  Will continue to progress as tolerated next visit  Plan: Continue per plan of care  Progress treatment as tolerated         Diagnosis: L knee pain   Precautions: HTN (controlled w/ meds)   POC Expires: 22   Re-evaluation Date: 22   FOTO Scores/Date: Goal - 70 ; 22 - 68   Visit Count 10 2/10      Manuals                                               Ther Ex       Bike  5 min L3      Lateral band walk grn 4 laps grn 4 laps      Monster walk grn 4 laps grn 4 laps      Leg press 95# 2x10 105# 10x, 115# 2x10      Step ups 8" 10x ea fwd/lat 8" 2x10 ea fwd/lat       TRX  Static lunges 10x ea      Resisted walking 4-way  25# 3 laps ea                                              HEP Creation/instruction Creation/instruction      Neuro Re-Ed 4/22 4/27      SLS  W/ 5# 10x W/ 5# 10x      SL cone  3 cones to low mat; 3x 3 cones to low mat; 3x      Standing hip 3-way grn 20x ea bilateral grn 20x ea bilateral                                                                                             Ther Act                                         Modalities

## 2022-05-02 DIAGNOSIS — K51.00 ULCERATIVE PANCOLITIS WITHOUT COMPLICATION (HCC): Primary | ICD-10-CM

## 2022-05-02 RX ORDER — MESALAMINE 800 MG/1
800 TABLET, DELAYED RELEASE ORAL 2 TIMES DAILY
Qty: 180 TABLET | Refills: 2 | Status: SHIPPED | OUTPATIENT
Start: 2022-05-02

## 2022-05-02 NOTE — TELEPHONE ENCOUNTER
Pt with hx of ulcerative colitis  Requesting a refill of his mesalamine  Dr Kaylah Denis changed dose to 1 tab twice a day  Please see note

## 2022-05-04 ENCOUNTER — OFFICE VISIT (OUTPATIENT)
Dept: PHYSICAL THERAPY | Facility: CLINIC | Age: 72
End: 2022-05-04
Payer: COMMERCIAL

## 2022-05-04 DIAGNOSIS — M17.12 PRIMARY OSTEOARTHRITIS OF LEFT KNEE: Primary | ICD-10-CM

## 2022-05-04 PROCEDURE — 97112 NEUROMUSCULAR REEDUCATION: CPT

## 2022-05-04 PROCEDURE — 97110 THERAPEUTIC EXERCISES: CPT

## 2022-05-04 NOTE — PROGRESS NOTES
Daily Note     Today's date: 2022  Patient name: Edilberto Pham  : 1950  MRN: [id number]  Referring provider: Quinton Madden DO  Dx:   Encounter Diagnosis     ICD-10-CM    1  Primary osteoarthritis of left knee  M17 12        Start Time: 932  Stop Time: 1016  Total time in clinic (min): 44 minutes    Subjective:  Patient reports no change in status  States he thinks is going to opt for gel injections of his knee  Reports compliance with HEP offering no questions or concerns      Objective: See treatment diary below      Assessment: patient was able to complete therapy being challenged by fatigue vs pain  Required cueing throughout on sequencing of exercises along with mechanics and rep/hold count  Patient was challenged by stability with reaching out of COG  Corrective cues on lunge mechanics and positioning required  Little to no use of UE support required with step up activities with small cues on vaulting compensations  Plan: Continue per plan of care  Progress treatment as tolerated         Diagnosis: L knee pain   Precautions: HTN (controlled w/ meds)   POC Expires: 22   Re-evaluation Date: 22   FOTO Scores/Date: Goal - 70 ; 22 - 68   Visit Count 10 2/10 3/10     Manuals  5/4                                             Ther Ex  5/4     Bike  5 min L3 L3 x 6 mins     Lateral band walk grn 4 laps grn 4 laps GTB x 4 laps     Monster walk grn 4 laps grn 4 laps GTB x 4 laps     Leg press 95# 2x10 105# 10x, 115# 2x10      Step ups 8" 10x ea fwd/lat 8" 2x10 ea fwd/lat Fwd/lat 8"2x10 ea     TRX  Static lunges 10x ea      Resisted walking 4-way  25# 3 laps ea                                              HEP Creation/instruction Creation/instruction      Neuro Re-Ed  5/4     SLS  W/ 5# 10x W/ 5# 10x      SL cone  3 cones to low mat; 3x 3 cones to low mat; 3x 3 cones to low mat x 3     Standing hip 3-way grn 20x ea bilateral grn 20x ea bilateral GTB x20 ea BL                                                                                            Ther Act                                         Modalities

## 2022-05-11 ENCOUNTER — OFFICE VISIT (OUTPATIENT)
Dept: PHYSICAL THERAPY | Facility: CLINIC | Age: 72
End: 2022-05-11
Payer: COMMERCIAL

## 2022-05-11 DIAGNOSIS — M17.12 PRIMARY OSTEOARTHRITIS OF LEFT KNEE: Primary | ICD-10-CM

## 2022-05-11 PROCEDURE — 97112 NEUROMUSCULAR REEDUCATION: CPT | Performed by: PHYSICAL THERAPIST

## 2022-05-11 PROCEDURE — 97110 THERAPEUTIC EXERCISES: CPT | Performed by: PHYSICAL THERAPIST

## 2022-05-11 NOTE — PROGRESS NOTES
Daily Note     Today's date: 2022  Patient name: Michelle Sharma  : 1950  MRN: [id number]  Referring provider: Betito Garcia DO  Dx:   Encounter Diagnosis     ICD-10-CM    1  Primary osteoarthritis of left knee  M17 12        Start Time: 0800  Stop Time: 0845  Total time in clinic (min): 45 minutes    Subjective: Pt reports typical morning stiffness and pain that progressively improves throughout the day, feels improving strength/stability since enrolling in therapy  Objective: See treatment diary below      Assessment: Pt with continued improvements in closed chain strength/stability with less reliance on UE assistance during standing hip 3-way but still reliant on occasional cues for avoidance of trunk leaning compensations  Pt with improving form with TRX assisted lunges but still somewhat reliant on UE assistance for stability at bottom portion of lunge but notable improvement in lunge depth without onset of pain  Pt with improving dynamic SL stability observed with SL cone  and was able to increase depth from low mat table to 6" box without provoking pain but notable difficulty with occasional contralateral toe touch to prevent LOB with SBA to CGA for safety  Pt denies increase in knee pain, only reduced joint stiffness and muscle fatigue  Will continue to progress as able next visit  Plan: Continue per plan of care  Progress treatment as tolerated         Diagnosis: L knee pain   Precautions: HTN (controlled w/ meds)   POC Expires: 22   Re-evaluation Date: 22   FOTO Scores/Date: Goal - 70 ; 22 - 68   Visit Count 1/10 2/10 3/10 4/10    Manuals                                             Ther Ex     Bike  5 min L3 L3 x 6 mins L3 x 6 min    Lateral band walk grn 4 laps grn 4 laps GTB x 4 laps grn 4 laps    Monster walk grn 4 laps grn 4 laps GTB x 4 laps grn 4 laps    Leg press 95# 2x10 105# 10x, 115# 2x10  125# 1x10, 135# 2x10, 145# x10    Step ups 8" 10x ea fwd/lat 8" 2x10 ea fwd/lat Fwd/lat 8"2x10 ea 8" Fwd/Lat 2x10 ea    TRX  Static lunges 10x ea  Static lunges 2x10 ea    Resisted walking 4-way  25# 3 laps ea  25# 3 laps ea                                            HEP Creation/instruction Creation/instruction      Neuro Re-Ed 4/22 4/27 5/4 5/11    SLS  W/ 5# 10x W/ 5# 10x  W/ 5# 10x    SL cone  3 cones to low mat; 3x 3 cones to low mat; 3x 3 cones to low mat x 3 3 cones to 6" box x 3    Standing hip 3-way grn 20x ea bilateral grn 20x ea bilateral GTB x20 ea BL GTB x20 ea bilateral                                                                                           Ther Act                                         Modalities

## 2022-05-24 ENCOUNTER — OFFICE VISIT (OUTPATIENT)
Dept: PHYSICAL THERAPY | Facility: CLINIC | Age: 72
End: 2022-05-24
Payer: COMMERCIAL

## 2022-05-24 DIAGNOSIS — M17.12 PRIMARY OSTEOARTHRITIS OF LEFT KNEE: Primary | ICD-10-CM

## 2022-05-24 PROCEDURE — 97110 THERAPEUTIC EXERCISES: CPT

## 2022-05-24 PROCEDURE — 97112 NEUROMUSCULAR REEDUCATION: CPT

## 2022-05-24 NOTE — PROGRESS NOTES
Daily Note     Today's date: 2022  Patient name: Amalia Shukla  : 1950  MRN: [id number]  Referring provider: Aba Johnson DO  Dx:   Encounter Diagnosis     ICD-10-CM    1  Primary osteoarthritis of left knee  M17 12                   Subjective: Patient denies pain at start of session but notes he cont to have discomfort with steps      Objective: See treatment diary below      Assessment: Tolerated treatment well  He is able to perform all charted exercises despite discomfort  He exhibits mod trunk sway and instability when in SLS requiring intermittent toes down of non working LE  He had one instance during static lunges where he had difficulty rising, he did not require assistance to rise from the airex  Patient demonstrated fatigue post treatment and would benefit from continued PT      Plan: Continue per plan of care        Diagnosis: L knee pain   Precautions: HTN (controlled w/ meds)   POC Expires: 22   Re-evaluation Date: 22   FOTO Scores/Date: Goal - 70 ; 22 -    Visit Count 1/10 2/10 3/10 4/10 5/10   Manuals                                            Ther Ex     Bike  5 min L3 L3 x 6 mins L3 x 6 min L3 6 min   Lateral band walk grn 4 laps grn 4 laps GTB x 4 laps grn 4 laps grn 4 laps   Monster walk grn 4 laps grn 4 laps GTB x 4 laps grn 4 laps grn 4 laps   Leg press 95# 2x10 105# 10x, 115# 2x10  125# 1x10, 135# 2x10, 145# x10 135# x15  145# x15  155# x15   Step ups 8" 10x ea fwd/lat 8" 2x10 ea fwd/lat Fwd/lat 8"2x10 ea 8" Fwd/Lat 2x10 ea 8" 2x10 ea  Fwd/lat   TRX  Static lunges 10x ea  Static lunges 2x10 ea Static lunges 2x10 B    Resisted walking 4-way  25# 3 laps ea  25# 3 laps ea 25# 3 laps ea                                            HEP Creation/instruction Creation/instruction      Neuro Re-Ed     SLS  W/ 5# 10x W/ 5# 10x  W/ 5# 10x w 5# pass 10x    SL cone  3 cones to low mat; 3x 3 cones to low mat; 3x 3 cones to low mat x 3 3 cones to 6" box x 3 3 cones to 6" box 3x    Standing hip 3-way grn 20x ea bilateral grn 20x ea bilateral GTB x20 ea BL GTB x20 ea bilateral GTB x20 ea B                                                                                          Ther Act                                         Modalities

## 2022-06-01 ENCOUNTER — EVALUATION (OUTPATIENT)
Dept: PHYSICAL THERAPY | Facility: CLINIC | Age: 72
End: 2022-06-01
Payer: COMMERCIAL

## 2022-06-01 ENCOUNTER — RA CDI HCC (OUTPATIENT)
Dept: OTHER | Facility: HOSPITAL | Age: 72
End: 2022-06-01

## 2022-06-01 DIAGNOSIS — M17.12 PRIMARY OSTEOARTHRITIS OF LEFT KNEE: Primary | ICD-10-CM

## 2022-06-01 PROCEDURE — 97110 THERAPEUTIC EXERCISES: CPT | Performed by: PHYSICAL THERAPIST

## 2022-06-01 NOTE — PROGRESS NOTES
PT Discharge    Today's date: 2022  Patient name: Frank Daniel  : 1950  MRN: [id number]  Referring provider: Louisa Hassan DO  Dx:   Encounter Diagnosis     ICD-10-CM    1  Primary osteoarthritis of left knee  M17 12        Start Time: 0800  Stop Time: 0830  Total time in clinic (min): 30 minutes    Assessment  Assessment details: Pt has been seen for 6 visits and has made good subjective/objective improvements since enrolling in therapy despite loss decrease in FOTO score from 68 to 66 since initial evaluation  Pt has returned all normal ADLs and recreational activities without pain or limitation and is appropriate for discharge to home exercise program at this time  Pt encouraged to continue with HEP on regular basis per tolerance and was educated on how to progress/regress exercises per symptoms/tolerance  Pt is in agreement with plan        Understanding of Dx/Px/POC: good   Prognosis: good    Goals  ST weeks  Pt will demonstrate good understanding and compliance with HEP MET   Pt will decrease left knee pain to 3-4/10 MET    LT weeks  Pt will decrease left knee pain to 0-1/10 NOT MET  Pt will increase left hip/knee strength to 5-/5 to allow for improved ability to squat, negotiate stairs, and prolonged community ambulation MET  Pt will improve FOTO score to > or = to 70 to indicate improved functional abilities NOT MET  Pt will ambulate with least restrictive AD and normal gait mechanics MET  Pt will demonstrate negative step down test to indicate good dynamic knee control  MET      Plan  Plan details: Discharge to [facility]  Planned modality interventions: cryotherapy and thermotherapy: hydrocollator packs  Planned therapy interventions: ADL training, manual therapy, neuromuscular re-education, patient education, postural training, self care, strengthening, stretching, therapeutic activities, therapeutic exercise, home exercise program, flexibility, functional ROM exercises, graded activity, graded exercise, body mechanics training and balance  Treatment plan discussed with: patient        Subjective Evaluation    History of Present Illness  Mechanism of injury: Pt has been seen for 6 visits and reports knees are stronger and more stable than when starting with PT  Pt is to see Dr Rodrigo Tamez next week and plans to do another round of viscosupplementation injections as he still has some pain and limited tolerance for activity and knows he has end stage osteoarthritis  Pt feels he is ready to transition from PT to a gym program at this time     Pain  Current pain ratin  At best pain ratin  At worst pain rating: 3  Quality: dull ache and sharp          Objective     General Comments:      Knee Comments  Left knee AROM: 0-135*    Left knee MMT: Flexion 5/5 Extension 5/5    Left hip MMT: Flex 5-/5 Abd 5-/5 Extension 4+/5    Gait Assessment: Pt ambulates unassisted with normal gait mechanics, notable varus deformity on left    Palpation: no TTP     Patellar mobility WNL    90/90 HS flexibility: (10*)  Elys: (-)    SLS on level surface: 30" with moderate ankle/hip strategy     FOTO: 66 (slightly worsened from 68 at IE)              Diagnosis: L knee pain   Precautions: HTN (controlled w/ meds)   POC Expires: 22   Re-evaluation Date: 22   FOTO Scores/Date: Goal - 70 ; 22 - 68; 22 - 66   Visit Count 1/10 2/10 3/10 4/10 5/10   Manuals                                            Ther Ex     Bike 5 min L3 5 min L3 L3 x 6 mins L3 x 6 min L3 6 min   Lateral band walk  grn 4 laps GTB x 4 laps grn 4 laps grn 4 laps   Monster walk  grn 4 laps GTB x 4 laps grn 4 laps grn 4 laps   Leg press  105# 10x, 115# 2x10  125# 1x10, 135# 2x10, 145# x10 135# x15  145# x15  155# x15   Step ups  8" 2x10 ea fwd/lat Fwd/lat 8"2x10 ea 8" Fwd/Lat 2x10 ea 8" 2x10 ea  Fwd/lat   TRX  Static lunges 10x ea  Static lunges 2x10 ea Static lunges 2x10 B    Resisted walking 4-way  25# 3 laps ea  25# 3 laps ea 25# 3 laps ea                                            HEP Re-assessed L knee and FOTO; updated PT prognosis/POC and goals  Creation/instruction      Neuro Re-Ed 6/1 4/27 5/4 5/11    SLS   W/ 5# 10x  W/ 5# 10x w 5# pass 10x    SL cone   3 cones to low mat; 3x 3 cones to low mat x 3 3 cones to 6" box x 3 3 cones to 6" box 3x    Standing hip 3-way  grn 20x ea bilateral GTB x20 ea BL GTB x20 ea bilateral GTB x20 ea B                                                                                          Ther Act                                         Modalities

## 2022-06-01 NOTE — PROGRESS NOTES
[facility]  coding opportunities       Chart reviewed, no opportunity found:   [address]        Patients Insurance     Medicare Insurance: [employer]

## 2022-06-06 ENCOUNTER — OFFICE VISIT (OUTPATIENT)
Dept: OBGYN CLINIC | Facility: CLINIC | Age: 72
End: 2022-06-06
Payer: COMMERCIAL

## 2022-06-06 VITALS
RESPIRATION RATE: 18 BRPM | HEIGHT: 71 IN | WEIGHT: 235 LBS | HEART RATE: 79 BPM | DIASTOLIC BLOOD PRESSURE: 94 MMHG | SYSTOLIC BLOOD PRESSURE: 145 MMHG | BODY MASS INDEX: 32.9 KG/M2

## 2022-06-06 DIAGNOSIS — M17.0 PRIMARY OSTEOARTHRITIS OF BOTH KNEES: Primary | ICD-10-CM

## 2022-06-06 PROCEDURE — 99213 OFFICE O/P EST LOW 20 MIN: CPT | Performed by: ORTHOPAEDIC SURGERY

## 2022-06-06 NOTE — PROGRESS NOTES
Patient Name:  Qing Carnes  MRN:  [id number]    [address] [place] [address]  Primary osteoarthritis of both knees  -     Injection Procedure Prior Authorization; Future      81 y o  male with Left knee osteoarthritis  Overall, patient doing well s/p outpatient PT with improvements of strength  At this time, patient considering viacosupplementation injections of bilateral knees; placed order today for Euflexxa  Advised patient to continue with home exercises as prescribed by outpatient PT, OTC oral and topical analgesics as needed for pain relief  Patient will follow up in office once injections are available for administration  History of the Present Illness   Qing Carnes is a 81 y o  male with Left knee osteoarthritis  Today, patient reports he is doing well with decreased pain since outpatient PT  He does note continued discomfort with stairs and long distance ambulation  He is considering repeat visco injections as these provided moderate pain relief in the past          Review of Systems     Review of Systems   Constitutional: Negative for chills and fever  HENT: Negative for ear pain and sore throat  Eyes: Negative for pain and visual disturbance  Respiratory: Negative for cough and shortness of breath  Cardiovascular: Negative for chest pain and palpitations  Gastrointestinal: Negative for abdominal pain and vomiting  Genitourinary: Negative for dysuria and hematuria  Musculoskeletal: Negative for arthralgias and back pain  Skin: Negative for color change and rash  Neurological: Negative for seizures and syncope  All other systems reviewed and are negative  Physical Exam     /94   Pulse 79   Resp 18   Ht 5' 10 5" (1 791 m)   Wt 107 kg (235 lb)   BMI 33 24 kg/m²     Left Knee  Range of motion from 0 to 130  There is no effusion  There is tenderness over the medial joint line  The patient is ableperform a straight leg raise with improved strength  Varus stress testing reveals no pain or laxity at 0 and 30 degrees   Valgus stress testing reveals no pain or laxity at 0 and 30 degrees  The patient is neurovascular intact distally  Right Knee  Range of motion from 0 to 130  There is no effusion  There is tenderness over the medial joint line  The patient is ableperform a straight leg raise  Varus stress testing reveals no pain or laxity at 0 and 30 degrees   Valgus stress testing reveals no pain or laxity at 0 and 30 degrees  The patient is neurovascular intact distally  Data Review     I have personally reviewed pertinent films in PACS, and my interpretation follows  No new images  X-rays performed at previous appointment demonstrates moderate-severe medial compartment degenerative changes, Left > Right  Mild patellofemoral degenerative changes       Social History     Tobacco Use    Smoking status: Former Smoker     Packs/day: 1 50     Years: 7 00     Pack years: 10 50     Types: Cigarettes     Quit date: 10/14/1980     Years since quittin 6    Smokeless tobacco: Never Used   Vaping Use    Vaping Use: Never used   Substance Use Topics    Alcohol use: Yes     Comment: rarely    Drug use: Never           Procedures  None     Maciej Eldridge PA-C

## 2022-06-14 ENCOUNTER — APPOINTMENT (OUTPATIENT)
Dept: LAB | Facility: HOSPITAL | Age: 72
End: 2022-06-14
Payer: COMMERCIAL

## 2022-06-14 DIAGNOSIS — I10 ESSENTIAL HYPERTENSION: ICD-10-CM

## 2022-06-14 DIAGNOSIS — Z00.00 ENCOUNTER FOR MEDICARE ANNUAL WELLNESS EXAM: ICD-10-CM

## 2022-06-14 DIAGNOSIS — E78.5 HYPERLIPIDEMIA, UNSPECIFIED HYPERLIPIDEMIA TYPE: ICD-10-CM

## 2022-06-14 DIAGNOSIS — Z11.59 NEED FOR HEPATITIS C SCREENING TEST: ICD-10-CM

## 2022-06-14 LAB
ALBUMIN SERPL BCP-MCNC: 3.4 G/DL (ref 3.5–5)
ALP SERPL-CCNC: 76 U/L (ref 46–116)
ALT SERPL W P-5'-P-CCNC: 27 U/L (ref 12–78)
ANION GAP SERPL CALCULATED.3IONS-SCNC: 8 MMOL/L (ref 4–13)
AST SERPL W P-5'-P-CCNC: 12 U/L (ref 5–45)
BASOPHILS # BLD AUTO: 0.03 THOUSANDS/ΜL (ref 0–0.1)
BASOPHILS NFR BLD AUTO: 0 % (ref 0–1)
BILIRUB SERPL-MCNC: 0.7 MG/DL (ref 0.2–1)
BUN SERPL-MCNC: 13 MG/DL (ref 5–25)
CALCIUM ALBUM COR SERPL-MCNC: 9 MG/DL (ref 8.3–10.1)
CALCIUM SERPL-MCNC: 8.5 MG/DL (ref 8.3–10.1)
CHLORIDE SERPL-SCNC: 103 MMOL/L (ref 100–108)
CHOLEST SERPL-MCNC: 193 MG/DL
CO2 SERPL-SCNC: 29 MMOL/L (ref 21–32)
CREAT SERPL-MCNC: 0.85 MG/DL (ref 0.6–1.3)
EOSINOPHIL # BLD AUTO: 0.26 THOUSAND/ΜL (ref 0–0.61)
EOSINOPHIL NFR BLD AUTO: 3 % (ref 0–6)
ERYTHROCYTE [DISTWIDTH] IN BLOOD BY AUTOMATED COUNT: 13.5 % (ref 11.6–15.1)
GFR SERPL CREATININE-BSD FRML MDRD: 88 ML/MIN/1.73SQ M
GLUCOSE P FAST SERPL-MCNC: 91 MG/DL (ref 65–99)
HCT VFR BLD AUTO: 46 % (ref 36.5–49.3)
HCV AB SER QL: NORMAL
HDLC SERPL-MCNC: 47 MG/DL
HGB BLD-MCNC: 15.4 G/DL (ref 12–17)
IMM GRANULOCYTES # BLD AUTO: 0.05 THOUSAND/UL (ref 0–0.2)
IMM GRANULOCYTES NFR BLD AUTO: 1 % (ref 0–2)
LDLC SERPL CALC-MCNC: 117 MG/DL (ref 0–100)
LYMPHOCYTES # BLD AUTO: 1.89 THOUSANDS/ΜL (ref 0.6–4.47)
LYMPHOCYTES NFR BLD AUTO: 22 % (ref 14–44)
MCH RBC QN AUTO: 29.6 PG (ref 26.8–34.3)
MCHC RBC AUTO-ENTMCNC: 33.5 G/DL (ref 31.4–37.4)
MCV RBC AUTO: 88 FL (ref 82–98)
MONOCYTES # BLD AUTO: 0.76 THOUSAND/ΜL (ref 0.17–1.22)
MONOCYTES NFR BLD AUTO: 9 % (ref 4–12)
NEUTROPHILS # BLD AUTO: 5.45 THOUSANDS/ΜL (ref 1.85–7.62)
NEUTS SEG NFR BLD AUTO: 65 % (ref 43–75)
NRBC BLD AUTO-RTO: 0 /100 WBCS
PLATELET # BLD AUTO: 262 THOUSANDS/UL (ref 149–390)
PMV BLD AUTO: 10.3 FL (ref 8.9–12.7)
POTASSIUM SERPL-SCNC: 3.7 MMOL/L (ref 3.5–5.3)
PROT SERPL-MCNC: 7 G/DL (ref 6.4–8.2)
RBC # BLD AUTO: 5.21 MILLION/UL (ref 3.88–5.62)
SODIUM SERPL-SCNC: 140 MMOL/L (ref 136–145)
TRIGL SERPL-MCNC: 145 MG/DL
WBC # BLD AUTO: 8.44 THOUSAND/UL (ref 4.31–10.16)

## 2022-06-14 PROCEDURE — 80053 COMPREHEN METABOLIC PANEL: CPT

## 2022-06-14 PROCEDURE — 80061 LIPID PANEL: CPT

## 2022-06-14 PROCEDURE — 36415 COLL VENOUS BLD VENIPUNCTURE: CPT

## 2022-06-14 PROCEDURE — 86803 HEPATITIS C AB TEST: CPT

## 2022-06-14 PROCEDURE — 85025 COMPLETE CBC W/AUTO DIFF WBC: CPT

## 2022-06-16 ENCOUNTER — OFFICE VISIT (OUTPATIENT)
Dept: GASTROENTEROLOGY | Facility: CLINIC | Age: 72
End: 2022-06-16
Payer: COMMERCIAL

## 2022-06-16 VITALS
WEIGHT: 233 LBS | HEIGHT: 70 IN | BODY MASS INDEX: 33.36 KG/M2 | HEART RATE: 72 BPM | SYSTOLIC BLOOD PRESSURE: 149 MMHG | DIASTOLIC BLOOD PRESSURE: 98 MMHG

## 2022-06-16 DIAGNOSIS — K63.89 PROCTOSIGMOIDITIS: Primary | ICD-10-CM

## 2022-06-16 DIAGNOSIS — Z51.81 MEDICATION MONITORING ENCOUNTER: ICD-10-CM

## 2022-06-16 DIAGNOSIS — D12.6 ADENOMATOUS POLYP OF COLON, UNSPECIFIED PART OF COLON: ICD-10-CM

## 2022-06-16 PROCEDURE — 3080F DIAST BP >= 90 MM HG: CPT | Performed by: INTERNAL MEDICINE

## 2022-06-16 PROCEDURE — 3077F SYST BP >= 140 MM HG: CPT | Performed by: INTERNAL MEDICINE

## 2022-06-16 PROCEDURE — 99213 OFFICE O/P EST LOW 20 MIN: CPT | Performed by: INTERNAL MEDICINE

## 2022-06-16 NOTE — PROGRESS NOTES
[facility] - Outpatient Follow-up Note  Edilberto Pham 81 y o  male MRN: [id number]  Encounter: [id number]          ASSESSMENT AND PLAN:      1  Proctosigmoiditis  Plus-minus a recent flare  Discussed taking mesalamine 2 tablets twice a day for 2 weeks then reduce to 3 a day for 2 weeks then back to 2 a day  He is to initiate folic acid  Next colonoscopy March of 2023    2  Adenomatous polyp of colon, unspecified part of colon  Up-to-date    3  Medication monitoring encounter  Tolerating well  Recent CMP CBC and lipids unremarkable  Reviewed with patient  He will otherwise follow-up in 6 months     ______________________________________________________________________    SUBJECTIVE:  Very pleasant 70-year-old gentleman diagnosed with proctosigmoiditis 2017  He likewise has a history tubular adenomas asymptomatic diverticulosis  Doing well on b i d  mesalamine  Had a recent flare went up the 2 twice a day then back down to 2 a day  He is monitoring it closely  Thinks he may have had a small amount of mucus and possibly little blood on toilet paper recently x1  He will continue to monitor  We reviewed his recent labs including negative HCV  REVIEW OF SYSTEMS IS OTHERWISE NEGATIVE        Historical Information   Past Medical History:   Diagnosis Date    Arthritis     Asthma     Colon polyp     GERD (gastroesophageal reflux disease)     Hypertension     Tinnitus     Ulcerative colitis ([address] )      Past Surgical History:   Procedure Laterality Date    COLONOSCOPY      ROTATOR CUFF REPAIR Right 1991     Social History   Social History     Substance and Sexual Activity   Alcohol Use Yes    Comment: rarely     Social History     Substance and Sexual Activity   Drug Use Never     Social History     Tobacco Use   Smoking Status Former Smoker    Packs/day: 1 50    Years: 7 00    Pack years: 10 50    Types: Cigarettes    Quit date: 10/14/1980    Years since quittin 6   Smokeless Tobacco Never Used     History reviewed  No pertinent family history  Meds/Allergies       Current Outpatient Medications:     albuterol (5 mg/mL) 0 5 % nebulizer solution    albuterol (PROVENTIL HFA,VENTOLIN HFA) 90 mcg/act inhaler    benazepril (LOTENSIN) 20 mg tablet    fluticasone (FLONASE) 50 mcg/act nasal spray    hydrochlorothiazide (HYDRODIURIL) 25 mg tablet    mesalamine (ASACOL) 800 MG EC tablet    multivitamin (THERAGRAN) TABS    Red Yeast Rice Extract (RED YEAST RICE PO)    No Known Allergies        Objective     Blood pressure 149/98, pulse 72, height 5' 10" (1 778 m), weight 106 kg (233 lb)  Body mass index is 33 43 kg/m²  PHYSICAL EXAM:      General Appearance:   Alert, cooperative, no distress   HEENT:   Normocephalic, atraumatic, anicteric      Neck:  Supple, symmetrical, trachea midline   Lungs:   Clear to auscultation bilaterally; no rales, rhonchi or wheezing; respirations unlabored    Heart[de-identified]   Regular rate and rhythm; no murmur, rub, or gallop  Abdomen:   Soft, non-tender, non-distended; normal bowel sounds; no masses, no organomegaly    Genitalia:   Deferred    Rectal:   Deferred    Extremities:  No cyanosis, clubbing or edema    Pulses:  2+ and symmetric    Skin:  No jaundice, rashes, or lesions    Lymph nodes:  No palpable cervical lymphadenopathy        Lab Results:   No visits with results within 1 Day(s) from this visit     Latest known visit with results is:   Appointment on 2022   Component Date Value    Cholesterol 2022 193     Triglycerides 2022 145     HDL, Direct 2022 47     LDL Calculated 2022 117 (A)    Hepatitis C Ab 2022 Non-reactive     WBC 2022 8 44     RBC 2022 5 21     Hemoglobin 2022 15 4     Hematocrit 2022 46 0     MCV 2022 88     MCH 2022 29 6     MCHC 2022 33 5     RDW 2022 13 5     MPV 2022 10 3     Platelets  262     nRBC 06/14/2022 0     Neutrophils Relative 06/14/2022 65     Immat GRANS % 06/14/2022 1     Lymphocytes Relative 06/14/2022 22     Monocytes Relative 06/14/2022 9     Eosinophils Relative 06/14/2022 3     Basophils Relative 06/14/2022 0     Neutrophils Absolute 06/14/2022 5 45     Immature Grans Absolute 06/14/2022 0 05     Lymphocytes Absolute 06/14/2022 1 89     Monocytes Absolute 06/14/2022 0 76     Eosinophils Absolute 06/14/2022 0 26     Basophils Absolute 06/14/2022 0 03     Sodium 06/14/2022 140     Potassium 06/14/2022 3 7     Chloride 06/14/2022 103     CO2 06/14/2022 29     ANION GAP 06/14/2022 8     BUN 06/14/2022 13     Creatinine 06/14/2022 0 85     Glucose, Fasting 06/14/2022 91     Calcium 06/14/2022 8 5     Corrected Calcium 06/14/2022 9 0     AST 06/14/2022 12     ALT 06/14/2022 27     Alkaline Phosphatase 06/14/2022 76     Total Protein 06/14/2022 7 0     Albumin 06/14/2022 3 4 (A)    Total Bilirubin 06/14/2022 0 70     eGFR 06/14/2022 88          Radiology Results:   No results found

## 2022-06-21 ENCOUNTER — OFFICE VISIT (OUTPATIENT)
Dept: FAMILY MEDICINE CLINIC | Facility: CLINIC | Age: 72
End: 2022-06-21
Payer: COMMERCIAL

## 2022-06-21 VITALS
TEMPERATURE: 97.6 F | WEIGHT: 238 LBS | HEIGHT: 70 IN | OXYGEN SATURATION: 98 % | SYSTOLIC BLOOD PRESSURE: 152 MMHG | HEART RATE: 68 BPM | BODY MASS INDEX: 34.07 KG/M2 | DIASTOLIC BLOOD PRESSURE: 98 MMHG

## 2022-06-21 DIAGNOSIS — I10 ESSENTIAL HYPERTENSION: Primary | ICD-10-CM

## 2022-06-21 DIAGNOSIS — E78.2 MIXED HYPERLIPIDEMIA: ICD-10-CM

## 2022-06-21 PROCEDURE — 1036F TOBACCO NON-USER: CPT | Performed by: FAMILY MEDICINE

## 2022-06-21 PROCEDURE — 99214 OFFICE O/P EST MOD 30 MIN: CPT | Performed by: FAMILY MEDICINE

## 2022-06-21 PROCEDURE — 1160F RVW MEDS BY RX/DR IN RCRD: CPT | Performed by: FAMILY MEDICINE

## 2022-06-21 PROCEDURE — 3725F SCREEN DEPRESSION PERFORMED: CPT | Performed by: FAMILY MEDICINE

## 2022-06-21 PROCEDURE — 3008F BODY MASS INDEX DOCD: CPT | Performed by: FAMILY MEDICINE

## 2022-06-21 RX ORDER — HYDROCHLOROTHIAZIDE 25 MG/1
12.5 TABLET ORAL DAILY
Qty: 45 TABLET | Refills: 1 | Status: SHIPPED | OUTPATIENT
Start: 2022-06-21 | End: 2022-07-22

## 2022-06-21 RX ORDER — ROSUVASTATIN CALCIUM 10 MG/1
10 TABLET, COATED ORAL DAILY
Qty: 90 TABLET | Refills: 1 | Status: SHIPPED | OUTPATIENT
Start: 2022-06-21

## 2022-06-21 RX ORDER — AMLODIPINE BESYLATE 10 MG/1
10 TABLET ORAL DAILY
Qty: 90 TABLET | Refills: 1 | Status: SHIPPED | OUTPATIENT
Start: 2022-06-21 | End: 2022-07-22 | Stop reason: SDUPTHER

## 2022-06-21 NOTE — PROGRESS NOTES
Assessment/Plan:    No problem-specific Assessment & Plan notes found for this encounter  Diagnoses and all orders for this visit:    Essential hypertension  Patient would like to decrease or stop the HCTZ due to urination frequency  Will half the dose and restart amlodipine  Patient states that he would like to retry the amlodipine because he feels the leg swelling is about the same without it as it was with it  Follow up in 1 month  -     amLODIPine (NORVASC) 10 mg tablet; Take 1 tablet (10 mg total) by mouth daily  -     hydrochlorothiazide (HYDRODIURIL) 25 mg tablet; Take 0 5 tablets (12 5 mg total) by mouth daily    Mixed hyperlipidemia  -     rosuvastatin (CRESTOR) 10 MG tablet; Take 1 tablet (10 mg total) by mouth daily        Subjective:      Patient ID: Cristofer Payne is a 81 y o  male  HPI     Hypertension  Patient presents for follow-up of hypertension  Home blood pressure readings: range 130s/80s to 160s/90s  Patient is not exercising and is not adherent to low salt diet  Use of agents associated with hypertension include none  Symptoms  [] Chest Pain  [] Dyspnea  [] Orthopnea  [] Blurred Vision  [] Palpitations  [] Headache  [] Peripheral Edema  [] Fatigue End Organ Damage  [] Hx of MI  [] Hx of Stroke  [] Hx of TIA  [] Heart Failure  [] LVH  [] CKD  [] PAD  [] Retinopathy Last BP's  BP Readings from Last 3 Encounters:   06/21/22 152/98   06/16/22 149/98   06/06/22 145/94        Notes that he is waking up once per night to urinate  States that he is urinate 5-6 times during the day  States that he goes a lot about 2 hours after he takes the HCTZ  The following portions of the patient's history were reviewed and updated as appropriate: allergies, current medications, past family history, past medical history, past social history, past surgical history and problem list     Review of Systems   Constitutional: Negative for chills, fatigue and fever     HENT: Negative for congestion, ear pain, rhinorrhea, sinus pain and sore throat  Eyes: Negative for pain  Respiratory: Negative for cough and shortness of breath  Cardiovascular: Negative for chest pain and leg swelling  Gastrointestinal: Negative for abdominal pain, constipation, diarrhea, nausea and vomiting  Genitourinary: Positive for frequency  Negative for difficulty urinating, dysuria and urgency  Skin: Negative for rash  Neurological: Negative for dizziness and headaches  Psychiatric/Behavioral: Negative for sleep disturbance  Objective:  /98 (BP Location: Left arm, Patient Position: Sitting, Cuff Size: Standard)   Pulse 68   Temp 97 6 °F (36 4 °C)   Ht 5' 10" (1 778 m)   Wt 108 kg (238 lb)   SpO2 98%   BMI 34 15 kg/m²      Physical Exam  Vitals reviewed  Constitutional:       General: He is not in acute distress  Appearance: Normal appearance  HENT:      Head: Normocephalic and atraumatic  Right Ear: External ear normal       Left Ear: External ear normal       Nose: Nose normal       Mouth/Throat:      Mouth: Mucous membranes are moist    Eyes:      Extraocular Movements: Extraocular movements intact  Conjunctiva/sclera: Conjunctivae normal    Cardiovascular:      Rate and Rhythm: Normal rate and regular rhythm  Heart sounds: Normal heart sounds  Pulmonary:      Breath sounds: Normal breath sounds  Abdominal:      General: Bowel sounds are normal  There is no distension  Palpations: Abdomen is soft  There is no mass  Tenderness: There is no abdominal tenderness  There is no guarding  Musculoskeletal:      Right lower leg: No edema  Left lower leg: No edema  Skin:     General: Skin is warm  Capillary Refill: Capillary refill takes less than 2 seconds  Neurological:      Mental Status: He is alert  Mental status is at baseline           Howe DO Jt Rowland [facility]  6/21/2022 10:06 AM

## 2022-07-14 ENCOUNTER — TELEPHONE (OUTPATIENT)
Dept: FAMILY MEDICINE CLINIC | Facility: CLINIC | Age: 72
End: 2022-07-14

## 2022-07-14 NOTE — TELEPHONE ENCOUNTER
He should cut his amlodipine dose in half to 5 mg daily   Continue with the HCTZ 25 mg and Benzapril 20 mg        Alisha Meneses [facility]  7/14/2022 4:31 PM

## 2022-07-14 NOTE — TELEPHONE ENCOUNTER
T/c from pt -- reports he was told to call the office if he thinks his bp is going too low after having started a new bp medication  Reports he has been lightheaded today and his bp readings were 115/71, 100/61, 107/71 -- he is unsure how low is too low  Would like Dr Wolfgang Kanner advisement

## 2022-07-22 ENCOUNTER — OFFICE VISIT (OUTPATIENT)
Dept: FAMILY MEDICINE CLINIC | Facility: CLINIC | Age: 72
End: 2022-07-22
Payer: COMMERCIAL

## 2022-07-22 VITALS
OXYGEN SATURATION: 97 % | HEART RATE: 77 BPM | BODY MASS INDEX: 34.07 KG/M2 | SYSTOLIC BLOOD PRESSURE: 134 MMHG | HEIGHT: 70 IN | DIASTOLIC BLOOD PRESSURE: 82 MMHG | TEMPERATURE: 97 F | WEIGHT: 238 LBS

## 2022-07-22 DIAGNOSIS — I10 ESSENTIAL HYPERTENSION: Primary | ICD-10-CM

## 2022-07-22 PROCEDURE — 3725F SCREEN DEPRESSION PERFORMED: CPT | Performed by: FAMILY MEDICINE

## 2022-07-22 PROCEDURE — 99214 OFFICE O/P EST MOD 30 MIN: CPT | Performed by: FAMILY MEDICINE

## 2022-07-22 PROCEDURE — 1160F RVW MEDS BY RX/DR IN RCRD: CPT | Performed by: FAMILY MEDICINE

## 2022-07-22 RX ORDER — HYDROCHLOROTHIAZIDE 12.5 MG/1
12.5 TABLET ORAL DAILY
Qty: 90 TABLET | Refills: 0 | Status: SHIPPED | OUTPATIENT
Start: 2022-07-22 | End: 2022-10-24 | Stop reason: SDUPTHER

## 2022-07-22 RX ORDER — BENAZEPRIL HYDROCHLORIDE 20 MG/1
20 TABLET ORAL DAILY
Qty: 90 TABLET | Refills: 1 | Status: SHIPPED | OUTPATIENT
Start: 2022-07-22

## 2022-07-22 RX ORDER — AMLODIPINE BESYLATE 5 MG/1
5 TABLET ORAL DAILY
Qty: 90 TABLET | Refills: 1 | Status: SHIPPED | OUTPATIENT
Start: 2022-07-22

## 2022-07-22 NOTE — PROGRESS NOTES
Assessment/Plan:    No problem-specific Assessment & Plan notes found for this encounter  Diagnoses and all orders for this visit:    Essential hypertension  -     amLODIPine (NORVASC) 5 mg tablet; Take 1 tablet (5 mg total) by mouth daily  -     hydrochlorothiazide (HYDRODIURIL) 12 5 mg tablet; Take 1 tablet (12 5 mg total) by mouth daily  -     benazepril (LOTENSIN) 20 mg tablet; Take 1 tablet (20 mg total) by mouth daily      Subjective:      Patient ID: Jamin Brown is a 81 y o  male  HPI    Hypertension  Patient presents for follow-up of hypertension  Home blood pressure readings: usual 120s/80s  Reports that before cutting the amlodipine to 5 mg, he was having dizziness and low BP of 90/60s or 100/60s  Since reducing the amlodipine to 5 mg, BP has been well controlled  Patient is exercising and is adherent to low salt diet  Use of agents associated with hypertension include none  Symptoms  [] Chest Pain  [] Dyspnea  [] Orthopnea  [] Blurred Vision  [] Palpitations  [] Headache  [] Peripheral Edema  [] Fatigue End Organ Damage  [] Hx of MI  [] Hx of Stroke  [] Hx of TIA    [] Heart Failure  [] LVH  [] CKD  [] PAD  [] Retinopathy Last BP's  BP Readings from Last 3 Encounters:   07/22/22 134/82   06/21/22 152/98   06/16/22 149/98      The following portions of the patient's history were reviewed and updated as appropriate: allergies, current medications, past family history, past medical history, past social history, past surgical history and problem list     Review of Systems   Constitutional: Negative for chills, fatigue and fever  HENT: Negative for congestion, ear pain, rhinorrhea, sinus pain and sore throat  Respiratory: Negative for cough and shortness of breath  Cardiovascular: Negative for chest pain and leg swelling  Gastrointestinal: Negative for abdominal pain, constipation, diarrhea, nausea and vomiting  Genitourinary: Negative for dysuria, frequency and urgency  Musculoskeletal: Negative for neck pain  Skin: Negative for rash  Neurological: Negative for dizziness, light-headedness and headaches  Objective:  /82 (BP Location: Left arm, Patient Position: Sitting, Cuff Size: Adult)   Pulse 77   Temp (!) 97 °F (36 1 °C) (Tympanic)   Ht 5' 10" (1 778 m)   Wt 108 kg (238 lb)   SpO2 97%   BMI 34 15 kg/m²      Physical Exam  Vitals reviewed  Constitutional:       General: He is not in acute distress  Appearance: Normal appearance  HENT:      Head: Normocephalic and atraumatic  Right Ear: External ear normal       Left Ear: External ear normal       Nose: Nose normal       Mouth/Throat:      Mouth: Mucous membranes are moist    Eyes:      Extraocular Movements: Extraocular movements intact  Conjunctiva/sclera: Conjunctivae normal    Cardiovascular:      Rate and Rhythm: Normal rate and regular rhythm  Heart sounds: Normal heart sounds  Pulmonary:      Breath sounds: Normal breath sounds  Abdominal:      General: Bowel sounds are normal       Palpations: Abdomen is soft  There is no mass  Tenderness: There is no abdominal tenderness  There is no guarding  Musculoskeletal:      Right lower leg: No edema  Left lower leg: No edema  Skin:     General: Skin is warm  Capillary Refill: Capillary refill takes less than 2 seconds  Neurological:      Mental Status: He is alert  Mental status is at baseline           DO [facility]  7/22/2022 10:27 AM

## 2022-07-28 ENCOUNTER — PROCEDURE VISIT (OUTPATIENT)
Dept: OBGYN CLINIC | Facility: CLINIC | Age: 72
End: 2022-07-28
Payer: COMMERCIAL

## 2022-07-28 VITALS
SYSTOLIC BLOOD PRESSURE: 149 MMHG | HEIGHT: 70 IN | BODY MASS INDEX: 33.93 KG/M2 | WEIGHT: 237 LBS | HEART RATE: 74 BPM | DIASTOLIC BLOOD PRESSURE: 92 MMHG

## 2022-07-28 DIAGNOSIS — M17.0 PRIMARY OSTEOARTHRITIS OF BOTH KNEES: Primary | ICD-10-CM

## 2022-07-28 PROCEDURE — 20610 DRAIN/INJ JOINT/BURSA W/O US: CPT | Performed by: PHYSICIAN ASSISTANT

## 2022-07-28 RX ORDER — HYALURONATE SODIUM 10 MG/ML
20 SYRINGE (ML) INTRAARTICULAR
Status: COMPLETED | OUTPATIENT
Start: 2022-07-28 | End: 2022-07-28

## 2022-07-28 RX ADMIN — Medication 20 MG: at 11:07

## 2022-07-28 NOTE — PROGRESS NOTES
After discussing the options for treatment, the patient elected to proceed forward with Bilateral knee Euflexxa injections to reduce pain  Risks of injection, including but not limited to, post-injection pain, swelling, pseudo septic reaction, skin rash, itching, and infection were discussed in detail  The patient understood, had no further questions and elected to proceed forward  After sterile preparation, the Euflexxa injection was injected into the Bilateral knees  The patient tolerated the procedure well no complications were noted  The patient was instructed ice and elevate the extremity, limit strenuous activity for the next 2-3 days, and to contact us if there were any questions or concerns prior to their follow-up appointment  I will see the patient back in 1 week for second injection of bilateral knees           Large joint arthrocentesis: R knee  Universal Protocol:  Consent given by: patient  Patient identity confirmed: verbally with patient    Supporting Documentation  Indications: pain   Procedure Details  Location: knee - R knee  Needle size: 22 G  Medications administered: 20 mg Sodium Hyaluronate 20 MG/2ML    Patient tolerance: patient tolerated the procedure well with no immediate complications  Dressing:  Sterile dressing applied    Large joint arthrocentesis: L knee  Universal Protocol:  Consent given by: patient  Patient identity confirmed: verbally with patient    Supporting Documentation  Indications: pain   Procedure Details  Location: knee - L knee  Needle size: 22 G  Approach: lateral  Medications administered: 20 mg Sodium Hyaluronate 20 MG/2ML    Patient tolerance: patient tolerated the procedure well with no immediate complications  Dressing:  Sterile dressing applied

## 2022-08-04 ENCOUNTER — PROCEDURE VISIT (OUTPATIENT)
Dept: OBGYN CLINIC | Facility: CLINIC | Age: 72
End: 2022-08-04
Payer: COMMERCIAL

## 2022-08-04 VITALS
WEIGHT: 235 LBS | SYSTOLIC BLOOD PRESSURE: 143 MMHG | HEIGHT: 70 IN | DIASTOLIC BLOOD PRESSURE: 83 MMHG | BODY MASS INDEX: 33.64 KG/M2 | HEART RATE: 92 BPM

## 2022-08-04 DIAGNOSIS — M17.0 PRIMARY OSTEOARTHRITIS OF BOTH KNEES: Primary | ICD-10-CM

## 2022-08-04 PROCEDURE — 20610 DRAIN/INJ JOINT/BURSA W/O US: CPT | Performed by: PHYSICIAN ASSISTANT

## 2022-08-04 RX ORDER — HYALURONATE SODIUM 10 MG/ML
20 SYRINGE (ML) INTRAARTICULAR
Status: COMPLETED | OUTPATIENT
Start: 2022-08-04 | End: 2022-08-04

## 2022-08-04 RX ADMIN — Medication 20 MG: at 11:24

## 2022-08-04 NOTE — PROGRESS NOTES
After discussing the options for treatment, the patient elected to proceed forward with Bilateral knee Euflexxa injection to reduce pain  Risks of injection, including but not limited to, post-injection pain, swelling, pseudo septic reaction, skin rash, itching, and infection were discussed in detail  The patient understood, had no further questions and elected to proceed forward  After sterile preparation, the Euflexxa injection was injected into the Bilateral knee  The patient tolerated the procedure well no complications were noted  The patient was instructed ice and elevate the extremity, limit strenuous activity for the next 2-3 days, and to contact us if there were any questions or concerns prior to their follow-up appointment  I will see the patient back in 1 week for third injections          Large joint arthrocentesis: R knee  Universal Protocol:  Consent given by: patient  Patient identity confirmed: verbally with patient    Supporting Documentation  Indications: pain   Procedure Details  Location: knee - R knee  Needle size: 22 G  Medications administered: 20 mg Sodium Hyaluronate 20 MG/2ML    Patient tolerance: patient tolerated the procedure well with no immediate complications  Dressing:  Sterile dressing applied    Large joint arthrocentesis: L knee  Universal Protocol:  Consent given by: patient  Patient identity confirmed: verbally with patient    Supporting Documentation  Indications: pain   Procedure Details  Location: knee - L knee  Needle size: 22 G  Approach: lateral  Medications administered: 20 mg Sodium Hyaluronate 20 MG/2ML    Patient tolerance: patient tolerated the procedure well with no immediate complications  Dressing:  Sterile dressing applied

## 2022-08-11 ENCOUNTER — PROCEDURE VISIT (OUTPATIENT)
Dept: OBGYN CLINIC | Facility: CLINIC | Age: 72
End: 2022-08-11
Payer: COMMERCIAL

## 2022-08-11 VITALS
BODY MASS INDEX: 33.93 KG/M2 | HEIGHT: 70 IN | SYSTOLIC BLOOD PRESSURE: 120 MMHG | WEIGHT: 237 LBS | DIASTOLIC BLOOD PRESSURE: 77 MMHG | HEART RATE: 84 BPM

## 2022-08-11 DIAGNOSIS — M17.0 PRIMARY OSTEOARTHRITIS OF BOTH KNEES: Primary | ICD-10-CM

## 2022-08-11 PROCEDURE — 20610 DRAIN/INJ JOINT/BURSA W/O US: CPT | Performed by: PHYSICIAN ASSISTANT

## 2022-08-11 RX ORDER — HYALURONATE SODIUM 10 MG/ML
20 SYRINGE (ML) INTRAARTICULAR
Status: COMPLETED | OUTPATIENT
Start: 2022-08-11 | End: 2022-08-11

## 2022-08-11 RX ADMIN — Medication 20 MG: at 09:49

## 2022-08-11 NOTE — PROGRESS NOTES
After discussing the options for treatment, the patient elected to proceed forward with Bilateral knee Euflexxa injection to reduce pain  Risks of injection, including but not limited to, post-injection pain, swelling, pseudo septic reaction, skin rash, itching, and infection were discussed in detail  The patient understood, had no further questions and elected to proceed forward  After sterile preparation, the Euflexxa injection was injected into the Bilateral knee  The patient tolerated the procedure well no complications were noted  The patient was instructed ice and elevate the extremity, limit strenuous activity for the next 2-3 days, and to contact us if there were any questions or concerns prior to their follow-up appointment  I will see the patient back in 8-10 weeks for reevaluation of bilateral knees          Large joint arthrocentesis: R knee  Universal Protocol:  Consent given by: patient  Patient identity confirmed: verbally with patient    Supporting Documentation  Indications: pain   Procedure Details  Location: knee - R knee  Needle size: 22 G  Medications administered: 20 mg Sodium Hyaluronate 20 MG/2ML    Patient tolerance: patient tolerated the procedure well with no immediate complications  Dressing:  Sterile dressing applied    Large joint arthrocentesis: L knee  Universal Protocol:  Consent given by: patient  Patient identity confirmed: verbally with patient    Supporting Documentation  Indications: pain   Procedure Details  Location: knee - L knee  Needle size: 22 G  Approach: lateral  Medications administered: 20 mg Sodium Hyaluronate 20 MG/2ML    Patient tolerance: patient tolerated the procedure well with no immediate complications  Dressing:  Sterile dressing applied

## 2022-10-24 DIAGNOSIS — I10 ESSENTIAL HYPERTENSION: ICD-10-CM

## 2022-10-24 RX ORDER — HYDROCHLOROTHIAZIDE 12.5 MG/1
12.5 TABLET ORAL DAILY
Qty: 90 TABLET | Refills: 0 | Status: SHIPPED | OUTPATIENT
Start: 2022-10-24

## 2022-12-06 ENCOUNTER — OFFICE VISIT (OUTPATIENT)
Dept: FAMILY MEDICINE CLINIC | Facility: CLINIC | Age: 72
End: 2022-12-06

## 2022-12-06 ENCOUNTER — PATIENT OUTREACH (OUTPATIENT)
Dept: FAMILY MEDICINE CLINIC | Facility: CLINIC | Age: 72
End: 2022-12-06

## 2022-12-06 VITALS
HEART RATE: 70 BPM | HEIGHT: 70 IN | SYSTOLIC BLOOD PRESSURE: 114 MMHG | OXYGEN SATURATION: 95 % | DIASTOLIC BLOOD PRESSURE: 82 MMHG | BODY MASS INDEX: 34.65 KG/M2 | TEMPERATURE: 97.2 F | WEIGHT: 242 LBS

## 2022-12-06 DIAGNOSIS — Z12.5 PROSTATE CANCER SCREENING: Primary | ICD-10-CM

## 2022-12-06 DIAGNOSIS — I10 ESSENTIAL HYPERTENSION: ICD-10-CM

## 2022-12-06 DIAGNOSIS — Z00.00 ENCOUNTER FOR MEDICARE ANNUAL WELLNESS EXAM: ICD-10-CM

## 2022-12-06 DIAGNOSIS — H61.23 BILATERAL IMPACTED CERUMEN: ICD-10-CM

## 2022-12-06 DIAGNOSIS — E78.2 MIXED HYPERLIPIDEMIA: ICD-10-CM

## 2022-12-06 DIAGNOSIS — Z59.9 FINANCIAL DIFFICULTIES: ICD-10-CM

## 2022-12-06 RX ORDER — ROSUVASTATIN CALCIUM 10 MG/1
10 TABLET, COATED ORAL DAILY
Qty: 90 TABLET | Refills: 1 | Status: SHIPPED | OUTPATIENT
Start: 2022-12-06

## 2022-12-06 RX ORDER — HYDROCHLOROTHIAZIDE 12.5 MG/1
12.5 TABLET ORAL DAILY
Qty: 90 TABLET | Refills: 1 | Status: SHIPPED | OUTPATIENT
Start: 2022-12-06

## 2022-12-06 RX ORDER — AMLODIPINE BESYLATE 5 MG/1
5 TABLET ORAL DAILY
Qty: 90 TABLET | Refills: 1 | Status: SHIPPED | OUTPATIENT
Start: 2022-12-06

## 2022-12-06 SDOH — ECONOMIC STABILITY - INCOME SECURITY: PROBLEM RELATED TO HOUSING AND ECONOMIC CIRCUMSTANCES, UNSPECIFIED: Z59.9

## 2022-12-06 NOTE — PATIENT INSTRUCTIONS
Medicare Preventive Visit Patient Instructions  Thank you for completing your Welcome to Medicare Visit or Medicare Annual Wellness Visit today  Your next wellness visit will be due in one year (12/7/2023)  The screening/preventive services that you may require over the next 5-10 years are detailed below  Some tests may not apply to you based off risk factors and/or age  Screening tests ordered at today's visit but not completed yet may show as past due  Also, please note that scanned in results may not display below  Preventive Screenings:  Service Recommendations Previous Testing/Comments   Colorectal Cancer Screening  · Colonoscopy    · Fecal Occult Blood Test (FOBT)/Fecal Immunochemical Test (FIT)  · Fecal DNA/Cologuard Test  · Flexible Sigmoidoscopy Age: 39-70 years old   Colonoscopy: every 10 years (May be performed more frequently if at higher risk)  OR  FOBT/FIT: every 1 year  OR  Cologuard: every 3 years  OR  Sigmoidoscopy: every 5 years  Screening may be recommended earlier than age 39 if at higher risk for colorectal cancer  Also, an individualized decision between you and your healthcare provider will decide whether screening between the ages of 74-80 would be appropriate   Colonoscopy: 03/11/2022  FOBT/FIT: Not on file  Cologuard: Not on file  Sigmoidoscopy: Not on file    Screening Current     Prostate Cancer Screening Individualized decision between patient and health care provider in men between ages of 53-78   Medicare will cover every 12 months beginning on the day after your 50th birthday PSA: No results in last 5 years           Hepatitis C Screening Once for adults born between 1945 and 1965  More frequently in patients at high risk for Hepatitis C Hep C Antibody: 06/14/2022    Screening Current   Diabetes Screening 1-2 times per year if you're at risk for diabetes or have pre-diabetes Fasting glucose: 91 mg/dL (6/14/2022)  A1C: No results in last 5 years (No results in last 5 years)  Screening Current   Cholesterol Screening Once every 5 years if you don't have a lipid disorder  May order more often based on risk factors  Lipid panel: 06/14/2022  Screening Not Indicated  History Lipid Disorder      Other Preventive Screenings Covered by Medicare:  1  Abdominal Aortic Aneurysm (AAA) Screening: covered once if your at risk  You're considered to be at risk if you have a family history of AAA or a male between the age of 73-68 who smoking at least 100 cigarettes in your lifetime  2  Lung Cancer Screening: covers low dose CT scan once per year if you meet all of the following conditions: (1) Age 50-69; (2) No signs or symptoms of lung cancer; (3) Current smoker or have quit smoking within the last 15 years; (4) You have a tobacco smoking history of at least 20 pack years (packs per day x number of years you smoked); (5) You get a written order from a healthcare provider  3  Glaucoma Screening: covered annually if you're considered high risk: (1) You have diabetes OR (2) Family history of glaucoma OR (3)  aged 48 and older OR (3)  American aged 72 and older  3  Osteoporosis Screening: covered every 2 years if you meet one of the following conditions: (1) Have a vertebral abnormality; (2) On glucocorticoid therapy for more than 3 months; (3) Have primary hyperparathyroidism; (4) On osteoporosis medications and need to assess response to drug therapy  5  HIV Screening: covered annually if you're between the age of 12-76  Also covered annually if you are younger than 13 and older than 72 with risk factors for HIV infection  For pregnant patients, it is covered up to 3 times per pregnancy      Immunizations:  Immunization Recommendations   Influenza Vaccine Annual influenza vaccination during flu season is recommended for all persons aged >= 6 months who do not have contraindications   Pneumococcal Vaccine   * Pneumococcal conjugate vaccine = PCV13 (Prevnar 13), PCV15 (Vaxneuvance), PCV20 (Prevnar 20)  * Pneumococcal polysaccharide vaccine = PPSV23 (Pneumovax) Adults 2364 years old: 1-3 doses may be recommended based on certain risk factors  Adults 72 years old: 1-2 doses may be recommended based off what pneumonia vaccine you previously received   Hepatitis B Vaccine 3 dose series if at intermediate or high risk (ex: diabetes, end stage renal disease, liver disease)   Tetanus (Td) Vaccine - COST NOT COVERED BY MEDICARE PART B Following completion of primary series, a booster dose should be given every 10 years to maintain immunity against tetanus  Td may also be given as tetanus wound prophylaxis  Tdap Vaccine - COST NOT COVERED BY MEDICARE PART B Recommended at least once for all adults  For pregnant patients, recommended with each pregnancy  Shingles Vaccine (Shingrix) - COST NOT COVERED BY MEDICARE PART B  2 shot series recommended in those aged 48 and above     Health Maintenance Due:      Topic Date Due   • Colorectal Cancer Screening  03/11/2023   • Hepatitis C Screening  Completed     Immunizations Due:      Topic Date Due   • Hepatitis B Vaccine (1 of 3 - 3-dose series) Never done   • COVID-19 Vaccine (1) Never done   • Pneumococcal Vaccine: 65+ Years (1 - PCV) Never done     Advance Directives   What are advance directives? Advance directives are legal documents that state your wishes and plans for medical care  These plans are made ahead of time in case you lose your ability to make decisions for yourself  Advance directives can apply to any medical decision, such as the treatments you want, and if you want to donate organs  What are the types of advance directives? There are many types of advance directives, and each state has rules about how to use them  You may choose a combination of any of the following:  · Living will: This is a written record of the treatment you want   You can also choose which treatments you do not want, which to limit, and which to stop at a certain time  This includes surgery, medicine, IV fluid, and tube feedings  · Durable power of  for healthcare [place] [facility]): This is a written record that states who you want to make healthcare choices for you when you are unable to make them for yourself  This person, called a proxy, is usually a family member or a friend  You may choose more than 1 proxy  · Do not resuscitate (DNR) order:  A DNR order is used in case your heart stops beating or you stop breathing  It is a request not to have certain forms of treatment, such as CPR  A DNR order may be included in other types of advance directives  · Medical directive: This covers the care that you want if you are in a coma, near death, or unable to make decisions for yourself  You can list the treatments you want for each condition  Treatment may include pain medicine, surgery, blood transfusions, dialysis, IV or tube feedings, and a ventilator (breathing machine)  · Values history: This document has questions about your views, beliefs, and how you feel and think about life  This information can help others choose the care that you would choose  Why are advance directives important? An advance directive helps you control your care  Although spoken wishes may be used, it is better to have your wishes written down  Spoken wishes can be misunderstood, or not followed  Treatments may be given even if you do not want them  An advance directive may make it easier for your family to make difficult choices about your care  Weight Management   Why it is important to manage your weight:  Being overweight increases your risk of health conditions such as heart disease, high blood pressure, type 2 diabetes, and certain types of cancer  It can also increase your risk for osteoarthritis, sleep apnea, and other respiratory problems  Aim for a slow, steady weight loss  Even a small amount of weight loss can lower your risk of health problems    How to lose weight safely:  A safe and healthy way to lose weight is to eat fewer calories and get regular exercise  You can lose up about 1 pound a week by decreasing the number of calories you eat by 500 calories each day  Healthy meal plan for weight management:  A healthy meal plan includes a variety of foods, contains fewer calories, and helps you stay healthy  A healthy meal plan includes the following:  · Eat whole-grain foods more often  A healthy meal plan should contain fiber  Fiber is the part of grains, fruits, and vegetables that is not broken down by your body  Whole-grain foods are healthy and provide extra fiber in your diet  Some examples of whole-grain foods are whole-wheat breads and pastas, oatmeal, brown rice, and bulgur  · Eat a variety of vegetables every day  Include dark, leafy greens such as spinach, kale, veronika greens, and mustard greens  Eat yellow and orange vegetables such as carrots, sweet potatoes, and winter squash  · Eat a variety of fruits every day  Choose fresh or canned fruit (canned in its own juice or light syrup) instead of juice  Fruit juice has very little or no fiber  · Eat low-fat dairy foods  Drink fat-free (skim) milk or 1% milk  Eat fat-free yogurt and low-fat cottage cheese  Try low-fat cheeses such as mozzarella and other reduced-fat cheeses  · Choose meat and other protein foods that are low in fat  Choose beans or other legumes such as split peas or lentils  Choose fish, skinless poultry (chicken or turkey), or lean cuts of red meat (beef or pork)  Before you cook meat or poultry, cut off any visible fat  · Use less fat and oil  Try baking foods instead of frying them  Add less fat, such as margarine, sour cream, regular salad dressing and mayonnaise to foods  Eat fewer high-fat foods  Some examples of high-fat foods include french fries, doughnuts, ice cream, and cakes  · Eat fewer sweets  Limit foods and drinks that are high in sugar   This includes candy, cookies, regular soda, and sweetened drinks  Exercise:  Exercise at least 30 minutes per day on most days of the week  Some examples of exercise include walking, biking, dancing, and swimming  You can also fit in more physical activity by taking the stairs instead of the elevator or parking farther away from stores  Ask your healthcare provider about the best exercise plan for you  © Copyright [address] 2018 Information is for End User's use only and may not be sold, redistributed or otherwise used for commercial purposes   All illustrations and images included in CareNotes® are the copyrighted property of A D [employer] M [employer]  or [address] [place] [address]

## 2022-12-06 NOTE — PROGRESS NOTES
OPCM  is responding to referral from PCP regarding patient reporting financial concerns on his SDOH assessment  Telephone call placed to patient and I introduced myself and explained my role  Gill Robbins informed me that he resides in his sister's home and he assists with the bills  He reports that he receives only $1,350 from [facility] and he does not have any other income  Gill Robbins reports that he had $ 1,6000 in savings but now only has about $8,000 due to the increased cost of things  Gill Robbins drives and he is independent with his ADL's  Gill Robbins informed me that he receives Extra Help from Medicare due to being low income  He wishes to apply for [facility] but he reports that his sister in not open to it  Gill Robbins denies being behind on his bills but reports having a large copayment for his medication Mesalamine  I provided supportive listening and information on community resources  Gill Robbins denies needing information on local food pantries  I informed iGll Hilliardr about Obed Wallace, Prescription Assistance and he wishes to apply  Telephone call placed to Obed Wallace via conference call with patient and we spoke to Shaunna  Our call was disconnected half way through the application  We called back and spoke to Jennifer and completed the application  Gill Robbins is advised to call back in three business days to check on the status of his application  I informed Gill Robbins to provide his Obed Wallace ID number to his pharmacy until he receives his card in the mail  I asked to be transferred to Provider Services to check is Mesalamine is on formulary  We were transferred to [facility] in [place] and he informed us that the drug Mesalamine is on formulary and is covered  Patient would pay $6 a month for this medication if he is approved  I also informed Gill Hilliardr that he does not need to provide his sister's financial information on his SNAP application   I told him that he would most likely need to provide a letter from his sister stating that he purchases his own food and cooks his own meals  I provided him with the telephone number for [facility] and the compass web site  Jj Custjuan denies having any other needs that I can assist him with at this time and he reports having sufficient food, medication, housing, transportation, and all basic necessities  I am closing this referral at this time but him to contact me if he should need any further assistance or support in the future and he agrees

## 2022-12-06 NOTE — PROGRESS NOTES
Assessment and Plan:     Problem List Items Addressed This Visit        Cardiovascular and Mediastinum    Essential hypertension    Relevant Medications    amLODIPine (NORVASC) 5 mg tablet    hydrochlorothiazide (HYDRODIURIL) 12 5 mg tablet    Other Relevant Orders    Lipid panel    CBC and differential    Comprehensive metabolic panel   Other Visit Diagnoses     Prostate cancer screening    -  Primary    Relevant Orders    PSA, Total Screen    Mixed hyperlipidemia        Relevant Medications    rosuvastatin (CRESTOR) 10 MG tablet    Other Relevant Orders    Lipid panel    CBC and differential    Comprehensive metabolic panel    Financial difficulties        Relevant Orders    Ambulatory referral to social work care management program    Encounter for Medicare annual wellness exam        Bilateral impacted cerumen        Relevant Orders    Ear cerumen removal (Completed)        BMI Counseling: Body mass index is 34 72 kg/m²  The BMI is above normal  Nutrition recommendations include decreasing portion sizes, encouraging healthy choices of fruits and vegetables, consuming healthier snacks, limiting drinks that contain sugar, moderation in carbohydrate intake, increasing intake of lean protein and reducing intake of cholesterol  Exercise recommendations include moderate physical activity 150 minutes/week and exercising 3-5 times per week  No pharmacotherapy was ordered  Rationale for BMI follow-up plan is due to patient being overweight or obese  Depression Screening and Follow-up Plan: Patient was screened for depression during today's encounter  They screened negative with a PHQ-2 score of 0  Preventive health issues were discussed with patient, and age appropriate screening tests were ordered as noted in patient's After Visit Summary  Personalized health advice and appropriate referrals for health education or preventive services given if needed, as noted in patient's After Visit Summary       History of Present Illness:     Patient presents for a Medicare Wellness Visit    HPI   Patient Care Team:  Shanna Davey DO as PCP - General (Family Medicine)    Notes that he is doing well  States that he has been compliant with his medications  States that he needs some refills  States that his ears have felt clogged for about 1 week  Notes some nasal congestion and runny nose for about 2 weeks  Using Flonase  Taking Claritin daily  Denies cough, fever or chills  Has GI appointment on Friday  GI symptoms well controlled  No UC issues  States that he is going to join the silver sneakers  Review of Systems:     Review of Systems   Constitutional: Negative for chills, fatigue and fever  HENT: Positive for ear pain (clogged) and rhinorrhea  Negative for congestion, sinus pain and sore throat  Respiratory: Negative for cough and shortness of breath  Cardiovascular: Negative for chest pain and leg swelling  Gastrointestinal: Negative for abdominal pain, constipation, diarrhea, nausea and vomiting  Genitourinary: Negative for dysuria, frequency and urgency  Skin: Negative for rash  Neurological: Negative for dizziness and headaches  Problem List:     Patient Active Problem List   Diagnosis   • Essential hypertension   • Extrinsic asthma without complication   • Ulcer[facility] colitis without complications ([facility] )   • Hx of adenomatous colonic polyps   • Primary osteoarthritis of both knees   • Medication monitoring encounter      Past Medical and Surgical History:     Past Medical History:   Diagnosis Date   • Arthritis    • Asthma    • Colon polyp    • GERD (gastroesophageal reflux disease)    • Hypertension    • Tinnitus    • Ulcerative colitis ([facility] )      Past Surgical History:   Procedure Laterality Date   • COLONOSCOPY     • ROTATOR CUFF REPAIR Right 1991      Family History:     History reviewed  No pertinent family history     Social History:     Social History     Socioeconomic History   • Marital status:      Spouse name: None   • Number of children: None   • Years of education: None   • Highest education level: None   Occupational History   • None   Tobacco Use   • Smoking status: Former     Packs/day: 1 50     Years: 7 00     Pack years: 10 50     Types: Cigarettes     Quit date: 10/14/1980     Years since quittin 1   • Smokeless tobacco: Never   Vaping Use   • Vaping Use: Never used   Substance and Sexual Activity   • Alcohol use: Yes     Comment: rarely   • Drug use: Never   • Sexual activity: None   Other Topics Concern   • None   Social History Narrative   • None     Social Determinants of Health     Financial Resource Strain: Medium Risk   • Difficulty of Paying Living Expenses: Somewhat hard   Food Insecurity: Not on file   Transportation Needs: No Transportation Needs   • Lack of Transportation (Medical): No   • Lack of Transportation (Non-Medical):  No   Physical Activity: Not on file   Stress: Not on file   Social Connections: Not on file   Intimate Partner Violence: Not on file   Housing Stability: Not on file      Medications and Allergies:     Current Outpatient Medications   Medication Sig Dispense Refill   • albuterol (5 mg/mL) 0 5 % nebulizer solution Take 0 5 mL (2 5 mg total) by nebulization every 6 (six) hours as needed for wheezing 180 mL 2   • amLODIPine (NORVASC) 5 mg tablet Take 1 tablet (5 mg total) by mouth daily 90 tablet 1   • benazepril (LOTENSIN) 20 mg tablet Take 1 tablet (20 mg total) by mouth daily 90 tablet 1   • fluticasone (FLONASE) 50 mcg/act nasal spray SPRAY 1 SPRAY INTO EACH NOSTRIL EVERY DAY 48 mL 0   • hydrochlorothiazide (HYDRODIURIL) 12 5 mg tablet Take 1 tablet (12 5 mg total) by mouth daily 90 tablet 1   • mesalamine (ASACOL) 800 MG EC tablet Take 1 tablet (800 mg total) by mouth 2 (two) times a day 180 tablet 2   • multivitamin (THERAGRAN) TABS Take 1 tablet by mouth daily     • rosuvastatin (CRESTOR) 10 MG tablet Take 1 tablet (10 mg total) by mouth daily 90 tablet 1   • albuterol (PROVENTIL HFA,VENTOLIN HFA) 90 mcg/act inhaler Inhale 2 puffs every 6 (six) hours as needed for wheezing   (Patient not taking: Reported on 8/4/2022)       No current facility-administered medications for this visit  No Known Allergies   Immunizations:     Immunization History   Administered Date(s) Administered   • INFLUENZA 10/29/2021, 11/01/2022   • Influenza, high dose seasonal 0 7 mL 10/14/2020   • Zoster Vaccine Recombinant 10/20/2020, 12/21/2020      Health Maintenance:         Topic Date Due   • Colorectal Cancer Screening  03/11/2023   • Hepatitis C Screening  Completed         Topic Date Due   • Hepatitis B Vaccine (1 of 3 - 3-dose series) Never done   • COVID-19 Vaccine (1) Never done   • Pneumococcal Vaccine: 65+ Years (1 - PCV) Never done      Medicare Screening Tests and Risk Assessments:     Nba Luther is here for his Subsequent Wellness visit  Last Medicare Wellness visit information reviewed, patient interviewed and updates made to the record today  Health Risk Assessment:   Patient rates overall health as very good  Patient feels that their physical health rating is same  Patient is very satisfied with their life  Eyesight was rated as slightly worse  Hearing was rated as same  Patient feels that their emotional and mental health rating is much better  Patients states they are never, rarely angry  Patient states they are never, rarely unusually tired/fatigued  Pain experienced in the last 7 days has been some  Patient's pain rating has been 4/10  Patient states that he has experienced no weight loss or gain in last 6 months  Depression Screening:   PHQ-2 Score: 0      Fall Risk Screening: In the past year, patient has experienced: no history of falling in past year      Home Safety:  Patient has trouble with stairs inside or outside of their home  Patient has working smoke alarms and has working carbon monoxide detector   Home safety hazards include: none  Nutrition:   Current diet is Regular  Medications:   Patient is currently taking over-the-counter supplements  OTC medications include: see medication list  Patient is able to manage medications  Activities of Daily Living (ADLs)/Instrumental Activities of Daily Living (IADLs):   Walk and transfer into and out of bed and chair?: Yes  Dress and groom yourself?: Yes    Bathe or shower yourself?: Yes    Feed yourself? Yes  Do your laundry/housekeeping?: Yes  Manage your money, pay your bills and track your expenses?: Yes  Make your own meals?: Yes    Do your own shopping?: Yes    Previous Hospitalizations:   Any hospitalizations or ED visits within the last 12 months?: No      Advance Care Planning:   Living will: No    Durable POA for healthcare: No    Advanced directive counseling given: Yes    Five wishes given: Yes      Cognitive Screening:   Provider or family/friend/caregiver concerned regarding cognition?: No    PREVENTIVE SCREENINGS      Cardiovascular Screening:    General: Screening Not Indicated and History Lipid Disorder      Diabetes Screening:     General: Screening Current      Colorectal Cancer Screening:     General: Screening Current      Prostate Cancer Screening:    General: Risks and Benefits Discussed      Osteoporosis Screening:    General: Screening Not Indicated      Abdominal Aortic Aneurysm (AAA) Screening:    Risk factors include: age between 73-67 yo and tobacco use        General: Risks and Benefits Discussed and Screening Current      Lung Cancer Screening:     General: Screening Not Indicated      Hepatitis C Screening:    General: Screening Current    Screening, Brief Intervention, and Referral to Treatment (SBIRT)    Screening  Typical number of drinks in a day: 0  Typical number of drinks in a week: 0  Interpretation: Low risk drinking behavior      Single Item Drug Screening:  How often have you used an illegal drug (including marijuana) or a prescription medication for non-medical reasons in the past year? never    Single Item Drug Screen Score: 0  Interpretation: Negative screen for possible drug use disorder    Brief Intervention  Alcohol & drug use screenings were reviewed  No concerns regarding substance use disorder identified  Other Counseling Topics:   Car/seat belt/driving safety and sunscreen  No results found  Physical Exam:     /82 (BP Location: Left arm, Patient Position: Sitting, Cuff Size: Large)   Pulse 70   Temp (!) 97 2 °F (36 2 °C)   Ht 5' 10" (1 778 m)   Wt 110 kg (242 lb)   SpO2 95%   BMI 34 72 kg/m²     Physical Exam  Vitals reviewed  Constitutional:       General: He is not in acute distress  Appearance: Normal appearance  HENT:      Head: Normocephalic and atraumatic  Right Ear: Tympanic membrane and external ear normal  There is impacted cerumen  Left Ear: Tympanic membrane and external ear normal  There is impacted cerumen  Ears:      Comments: TM normal, visualized after cerumen removal     Nose: Nose normal       Mouth/Throat:      Mouth: Mucous membranes are moist    Eyes:      Extraocular Movements: Extraocular movements intact  Conjunctiva/sclera: Conjunctivae normal    Cardiovascular:      Rate and Rhythm: Normal rate and regular rhythm  Heart sounds: Normal heart sounds  Pulmonary:      Effort: Pulmonary effort is normal       Breath sounds: Normal breath sounds  No wheezing, rhonchi or rales  Abdominal:      General: Bowel sounds are normal  There is no distension  Palpations: Abdomen is soft  Tenderness: There is no abdominal tenderness  Musculoskeletal:      Cervical back: Neck supple  Right lower leg: No edema  Left lower leg: No edema  Lymphadenopathy:      Cervical: No cervical adenopathy  Skin:     General: Skin is warm  Capillary Refill: Capillary refill takes less than 2 seconds  Findings: No rash     Neurological:      Mental Status: He is alert  Mental status is at baseline  Ear cerumen removal    Date/Time: 12/6/2022 9:54 AM  Performed by: Ida San DO  Authorized by: Ida San DO   Universal Protocol:  Consent given by: patient  Patient understanding: patient states understanding of the procedure being performed      Patient location:  Clinic  Procedure details:     Location:  R ear and L ear    Procedure type: irrigation with instrumentation      Instrumentation: curette      Approach:  Natural orifice  Post-procedure details:     Complication:  None    Hearing quality:  Improved    Patient tolerance of procedure:   Tolerated well, no immediate complications      Ida San DO

## 2022-12-09 ENCOUNTER — TELEPHONE (OUTPATIENT)
Dept: GASTROENTEROLOGY | Facility: CLINIC | Age: 72
End: 2022-12-09

## 2022-12-09 ENCOUNTER — OFFICE VISIT (OUTPATIENT)
Dept: GASTROENTEROLOGY | Facility: CLINIC | Age: 72
End: 2022-12-09

## 2022-12-09 VITALS
WEIGHT: 243.6 LBS | HEIGHT: 70 IN | BODY MASS INDEX: 34.88 KG/M2 | DIASTOLIC BLOOD PRESSURE: 92 MMHG | SYSTOLIC BLOOD PRESSURE: 125 MMHG | HEART RATE: 73 BPM

## 2022-12-09 DIAGNOSIS — K51.00 ULCERATIVE PANCOLITIS WITHOUT COMPLICATION (HCC): ICD-10-CM

## 2022-12-09 DIAGNOSIS — D12.6 ADENOMATOUS POLYP OF COLON, UNSPECIFIED PART OF COLON: ICD-10-CM

## 2022-12-09 DIAGNOSIS — K63.89 PROCTOSIGMOIDITIS: Primary | ICD-10-CM

## 2022-12-09 RX ORDER — MESALAMINE 800 MG/1
800 TABLET, DELAYED RELEASE ORAL 2 TIMES DAILY
Qty: 60 TABLET | Refills: 5 | Status: SHIPPED | OUTPATIENT
Start: 2022-12-09

## 2022-12-09 NOTE — TELEPHONE ENCOUNTER
Scheduled date of colonoscopy (as of today): 3/9/23  Physician performing colonoscopy: Dr Royce Weir  Location of colonoscopy: [facility]  Bowel prep reviewed with patient: Golytely  Instructions reviewed with patient by: indira  Clearances: n/a

## 2022-12-09 NOTE — TELEPHONE ENCOUNTER
[facility] Assessment    Name: Ashleigh Stover  YOB: 1950  Last Height: 5' 10" (1 778 m)  Last weight: 110 kg (243 lb 9 6 oz)  BMI: 34 95 kg/m²  Procedure: Colon  Diagnosis: 3/9/23  Date of procedure: see order  Prep: Golytely  Responsible : yes  Phone#: [phone]  Name completing form: Rob White  Date form completed: 12/09/22      If the patient answers yes to any of these questions, schedule in a hospital  Are you pregnant: No  Do you rely on a wheelchair for mobility: No  Have you been diagnosed with End Stage Renal Disease (ESRD): No  Do you need oxygen during the day: No  Have you had a heart attack or stroke within the past three months: No  Have you had a seizure within the past three months: No  Have you ever been informed by anesthesia that you have a difficult airway: No  Additional Questions  Have you had any cardiac testing or are under the care of a Cardiologist (see cardiac list): No  Cardiac list:   Do you have an implanted cardiac defibrillator: No (Comment:  This patient should be scheduled in the hospital)    Have any bleeding problems, such as anemia or hemophilia (If patient has H&H result below 8, schedule in hospital   H&H must be within 30 days of procedure): No    Had an organ transplant within the past 3 months: No    Do you have any present infections: No  Do you get short of breath when walking a few blocks: No  Have you been diagnosed with diabetes: No  Comments (provide cardiac provider information if applicable):

## 2022-12-09 NOTE — PROGRESS NOTES
[facility]ignesh Machado [facility] - Outpatient Follow-up Note  Arthur Vazquez 81 y o  male MRN: [id number]  Encounter: [id number]          ASSESSMENT AND PLAN:      1  Proctosigmoiditis  Patient with history of ulcerative colitis, diagnosed back in 2017 and symptoms are primarily controlled with Mesalamine 800mg BID, will need repeat colonoscopy in March of next year for follow-up due to history of ulcerative colitis and tubular adenomatous colon polyps  - C-reactive protein; Future  - Colonoscopy; Future  - polyethylene glycol (GOLYTELY) 4000 mL solution; Take 4,000 mL by mouth once for 1 dose  Dispense: 4000 mL; Refill: 0    2  Ulcerative pancolitis without complication ([address] )  -Above and will add CRP to upcoming blood work, did have a flare earlier this year but is doing much better  - mesalamine (ASACOL) 800 MG EC tablet; Take 1 tablet (800 mg total) by mouth 2 (two) times a day  Dispense: 60 tablet; Refill: 5    3  Adenomatous polyp of colon, unspecified part of colon  Plan for colonoscopy as above in March which was scheduled and GoLytely prep ordered    ______________________________________________________________________    SUBJECTIVE:      This is a 37-year-old male who has a history of ulcerative colitis presents today for follow-up  He was having flare symptoms and decrease his mesalamine now since he has been doing well to 800 twice daily  Colonoscopy was performed earlier this year which showed sigmoiditis and moderate left sided diverticulosis in the hepatic flexure and small internal hemorrhoids  Patient otherwise reports that he moves his bowels twice a day and has been doing well  Denies rectal bleeding and does not need to use Rowasa enemas  Does have upcoming blood work from his primary doctor  REVIEW OF SYSTEMS IS OTHERWISE NEGATIVE        Historical Information   Past Medical History:   Diagnosis Date   • Arthritis    • Asthma    • Colon polyp    • GERD (gastroesophageal reflux disease)    • Hypertension    • Tinnitus    • Ulcerative colitis ([facility] [address] )      Past Surgical History:   Procedure Laterality Date   • COLONOSCOPY     • ROTATOR CUFF REPAIR Right      Social History   Social History     Substance and Sexual Activity   Alcohol Use Not Currently    Comment: rarely     Social History     Substance and Sexual Activity   Drug Use Never     Social History     Tobacco Use   Smoking Status Former   • Packs/day: 1 50   • Years: 7 00   • Pack years: 10 50   • Types: Cigarettes   • Quit date: 10/14/1980   • Years since quittin 1   Smokeless Tobacco Never     Family History   Problem Relation Age of Onset   • Crohn's disease Cousin        Meds/Allergies       Current Outpatient Medications:   •  amLODIPine (NORVASC) 5 mg tablet  •  benazepril (LOTENSIN) 20 mg tablet  •  fluticasone (FLONASE) 50 mcg/act nasal spray  •  hydrochlorothiazide (HYDRODIURIL) 12 5 mg tablet  •  mesalamine (ASACOL) 800 MG EC tablet  •  multivitamin (THERAGRAN) TABS  •  polyethylene glycol (GOLYTELY) 4000 mL solution  •  rosuvastatin (CRESTOR) 10 MG tablet  •  albuterol (5 mg/mL) 0 5 % nebulizer solution  •  albuterol (PROVENTIL HFA,VENTOLIN HFA) 90 mcg/act inhaler    No Known Allergies        Objective     Blood pressure 125/92, pulse 73, height 5' 10" (1 778 m), weight 110 kg (243 lb 9 6 oz)  Body mass index is 34 95 kg/m²  PHYSICAL EXAM:      General Appearance:   Alert, cooperative, no distress   HEENT:   Normocephalic, atraumatic, anicteric      Neck:  Supple, symmetrical, trachea midline   Lungs:   Clear to auscultation bilaterally; no rales, rhonchi or wheezing; respirations unlabored    Heart[de-identified]   Regular rate and rhythm; no murmur, rub, or gallop     Abdomen:   Soft, non-tender, non-distended; normal bowel sounds; no masses, no organomegaly    Genitalia:   Deferred    Rectal:   Deferred    Extremities:  No cyanosis, clubbing or edema    Pulses:  2+ and symmetric    Skin:  No jaundice, rashes, or lesions    Lymph nodes:  No palpable cervical lymphadenopathy        Lab Results:   No visits with results within 1 Day(s) from this visit  Latest known visit with results is:   Appointment on 06/14/2022   Component Date Value   • Cholesterol 06/14/2022 193    • Triglycerides 06/14/2022 145    • HDL, Direct 06/14/2022 47    • LDL Calculated 06/14/2022 117 (H)    • Hepatitis C Ab 06/14/2022 Non-reactive    • WBC 06/14/2022 8 44    • RBC 06/14/2022 5 21    • Hemoglobin 06/14/2022 15 4    • Hematocrit 06/14/2022 46 0    • MCV 06/14/2022 88    • MCH 06/14/2022 29 6    • MCHC 06/14/2022 33 5    • RDW 06/14/2022 13 5    • MPV 06/14/2022 10 3    • Platelets 31/72/0841 262    • nRBC 06/14/2022 0    • Neutrophils Relative 06/14/2022 65    • Immat GRANS % 06/14/2022 1    • Lymphocytes Relative 06/14/2022 22    • Monocytes Relative 06/14/2022 9    • Eosinophils Relative 06/14/2022 3    • Basophils Relative 06/14/2022 0    • Neutrophils Absolute 06/14/2022 5 45    • Immature Grans Absolute 06/14/2022 0 05    • Lymphocytes Absolute 06/14/2022 1 89    • Monocytes Absolute 06/14/2022 0 76    • Eosinophils Absolute 06/14/2022 0 26    • Basophils Absolute 06/14/2022 0 03    • Sodium 06/14/2022 140    • Potassium 06/14/2022 3 7    • Chloride 06/14/2022 103    • CO2 06/14/2022 29    • ANION GAP 06/14/2022 8    • BUN 06/14/2022 13    • Creatinine 06/14/2022 0 85    • Glucose, Fasting 06/14/2022 91    • Calcium 06/14/2022 8 5    • Corrected Calcium 06/14/2022 9 0    • AST 06/14/2022 12    • ALT 06/14/2022 27    • Alkaline Phosphatase 06/14/2022 76    • Total Protein 06/14/2022 7 0    • Albumin 06/14/2022 3 4 (L)    • Total Bilirubin 06/14/2022 0 70    • eGFR 06/14/2022 88          Radiology Results:   No results found

## 2023-01-04 ENCOUNTER — APPOINTMENT (OUTPATIENT)
Dept: LAB | Facility: HOSPITAL | Age: 73
End: 2023-01-04

## 2023-01-04 DIAGNOSIS — I10 ESSENTIAL HYPERTENSION: ICD-10-CM

## 2023-01-04 DIAGNOSIS — Z12.5 PROSTATE CANCER SCREENING: ICD-10-CM

## 2023-01-04 DIAGNOSIS — E78.2 MIXED HYPERLIPIDEMIA: ICD-10-CM

## 2023-01-04 DIAGNOSIS — K63.89 PROCTOSIGMOIDITIS: ICD-10-CM

## 2023-01-04 LAB
ALBUMIN SERPL BCP-MCNC: 3.6 G/DL (ref 3.5–5)
ALP SERPL-CCNC: 87 U/L (ref 46–116)
ALT SERPL W P-5'-P-CCNC: 39 U/L (ref 12–78)
ANION GAP SERPL CALCULATED.3IONS-SCNC: 9 MMOL/L (ref 4–13)
AST SERPL W P-5'-P-CCNC: 15 U/L (ref 5–45)
BASOPHILS # BLD AUTO: 0.05 THOUSANDS/ÂΜL (ref 0–0.1)
BASOPHILS NFR BLD AUTO: 1 % (ref 0–1)
BILIRUB SERPL-MCNC: 0.76 MG/DL (ref 0.2–1)
BUN SERPL-MCNC: 15 MG/DL (ref 5–25)
CALCIUM SERPL-MCNC: 8.7 MG/DL (ref 8.3–10.1)
CHLORIDE SERPL-SCNC: 102 MMOL/L (ref 96–108)
CHOLEST SERPL-MCNC: 148 MG/DL
CO2 SERPL-SCNC: 29 MMOL/L (ref 21–32)
CREAT SERPL-MCNC: 0.81 MG/DL (ref 0.6–1.3)
CRP SERPL QL: 4.5 MG/L
EOSINOPHIL # BLD AUTO: 0.26 THOUSAND/ÂΜL (ref 0–0.61)
EOSINOPHIL NFR BLD AUTO: 3 % (ref 0–6)
ERYTHROCYTE [DISTWIDTH] IN BLOOD BY AUTOMATED COUNT: 13.2 % (ref 11.6–15.1)
GFR SERPL CREATININE-BSD FRML MDRD: 90 ML/MIN/1.73SQ M
GLUCOSE P FAST SERPL-MCNC: 101 MG/DL (ref 65–99)
HCT VFR BLD AUTO: 45.8 % (ref 36.5–49.3)
HDLC SERPL-MCNC: 54 MG/DL
HGB BLD-MCNC: 15.4 G/DL (ref 12–17)
IMM GRANULOCYTES # BLD AUTO: 0.04 THOUSAND/UL (ref 0–0.2)
IMM GRANULOCYTES NFR BLD AUTO: 1 % (ref 0–2)
LDLC SERPL CALC-MCNC: 75 MG/DL (ref 0–100)
LYMPHOCYTES # BLD AUTO: 1.63 THOUSANDS/ÂΜL (ref 0.6–4.47)
LYMPHOCYTES NFR BLD AUTO: 20 % (ref 14–44)
MCH RBC QN AUTO: 28.8 PG (ref 26.8–34.3)
MCHC RBC AUTO-ENTMCNC: 33.6 G/DL (ref 31.4–37.4)
MCV RBC AUTO: 86 FL (ref 82–98)
MONOCYTES # BLD AUTO: 0.73 THOUSAND/ÂΜL (ref 0.17–1.22)
MONOCYTES NFR BLD AUTO: 9 % (ref 4–12)
NEUTROPHILS # BLD AUTO: 5.53 THOUSANDS/ÂΜL (ref 1.85–7.62)
NEUTS SEG NFR BLD AUTO: 66 % (ref 43–75)
NONHDLC SERPL-MCNC: 94 MG/DL
NRBC BLD AUTO-RTO: 0 /100 WBCS
PLATELET # BLD AUTO: 261 THOUSANDS/UL (ref 149–390)
PMV BLD AUTO: 9.8 FL (ref 8.9–12.7)
POTASSIUM SERPL-SCNC: 3.7 MMOL/L (ref 3.5–5.3)
PROT SERPL-MCNC: 7.3 G/DL (ref 6.4–8.4)
PSA SERPL-MCNC: 1.8 NG/ML (ref 0–4)
RBC # BLD AUTO: 5.34 MILLION/UL (ref 3.88–5.62)
SODIUM SERPL-SCNC: 140 MMOL/L (ref 135–147)
TRIGL SERPL-MCNC: 93 MG/DL
WBC # BLD AUTO: 8.24 THOUSAND/UL (ref 4.31–10.16)

## 2023-02-16 DIAGNOSIS — K51.00 ULCERATIVE PANCOLITIS WITHOUT COMPLICATION (HCC): ICD-10-CM

## 2023-02-16 RX ORDER — MESALAMINE 800 MG/1
TABLET, DELAYED RELEASE ORAL
Qty: 180 TABLET | Refills: 2 | Status: SHIPPED | OUTPATIENT
Start: 2023-02-16

## 2023-02-23 ENCOUNTER — ANESTHESIA EVENT (OUTPATIENT)
Dept: ANESTHESIOLOGY | Facility: AMBULATORY SURGERY CENTER | Age: 73
End: 2023-02-23

## 2023-02-23 ENCOUNTER — ANESTHESIA (OUTPATIENT)
Dept: ANESTHESIOLOGY | Facility: AMBULATORY SURGERY CENTER | Age: 73
End: 2023-02-23

## 2023-02-23 ENCOUNTER — TELEPHONE (OUTPATIENT)
Dept: GASTROENTEROLOGY | Facility: CLINIC | Age: 73
End: 2023-02-23

## 2023-02-23 NOTE — TELEPHONE ENCOUNTER
Left message for patient reminding him of his colonoscopy 3/9 with Dr Howie White at [facility]  He will need a , will be called day before with arrival time and be on clear liquids with bowel prep  If he didn't receive his prep or instructions, he is to call

## 2023-03-09 ENCOUNTER — HOSPITAL ENCOUNTER (OUTPATIENT)
Dept: GASTROENTEROLOGY | Facility: AMBULATORY SURGERY CENTER | Age: 73
Discharge: HOME | End: 2023-03-09

## 2023-03-09 ENCOUNTER — ANESTHESIA EVENT (OUTPATIENT)
Dept: GASTROENTEROLOGY | Facility: AMBULATORY SURGERY CENTER | Age: 73
End: 2023-03-09

## 2023-03-09 ENCOUNTER — ANESTHESIA (OUTPATIENT)
Dept: GASTROENTEROLOGY | Facility: AMBULATORY SURGERY CENTER | Age: 73
End: 2023-03-09

## 2023-03-09 VITALS
HEART RATE: 73 BPM | BODY MASS INDEX: 34.79 KG/M2 | RESPIRATION RATE: 18 BRPM | TEMPERATURE: 97.3 F | HEIGHT: 70 IN | SYSTOLIC BLOOD PRESSURE: 124 MMHG | WEIGHT: 243 LBS | OXYGEN SATURATION: 92 % | DIASTOLIC BLOOD PRESSURE: 79 MMHG

## 2023-03-09 DIAGNOSIS — K51.00 ULCERATIVE PANCOLITIS WITHOUT COMPLICATION (HCC): Primary | ICD-10-CM

## 2023-03-09 DIAGNOSIS — K63.89 PROCTOSIGMOIDITIS: ICD-10-CM

## 2023-03-09 RX ORDER — PROPOFOL 10 MG/ML
INJECTION, EMULSION INTRAVENOUS AS NEEDED
Status: DISCONTINUED | OUTPATIENT
Start: 2023-03-09 | End: 2023-03-09

## 2023-03-09 RX ORDER — SODIUM CHLORIDE 9 MG/ML
INJECTION, SOLUTION INTRAVENOUS CONTINUOUS PRN
Status: DISCONTINUED | OUTPATIENT
Start: 2023-03-09 | End: 2023-03-09

## 2023-03-09 RX ORDER — HYDROCORTISONE 100 MG/60ML
100 SUSPENSION RECTAL
Qty: 14 ENEMA | Refills: 1 | Status: SHIPPED | OUTPATIENT
Start: 2023-03-09 | End: 2023-03-23

## 2023-03-09 RX ADMIN — PROPOFOL 50 MG: 10 INJECTION, EMULSION INTRAVENOUS at 07:32

## 2023-03-09 RX ADMIN — PROPOFOL 50 MG: 10 INJECTION, EMULSION INTRAVENOUS at 07:33

## 2023-03-09 RX ADMIN — SODIUM CHLORIDE: 9 INJECTION, SOLUTION INTRAVENOUS at 07:15

## 2023-03-09 RX ADMIN — PROPOFOL 50 MG: 10 INJECTION, EMULSION INTRAVENOUS at 07:35

## 2023-03-09 RX ADMIN — PROPOFOL 50 MG: 10 INJECTION, EMULSION INTRAVENOUS at 07:34

## 2023-03-09 NOTE — ANESTHESIA PREPROCEDURE EVALUATION
Procedure:  COLONOSCOPY    Relevant Problems   CARDIO   (+) Essential hypertension      MUSCULOSKELETAL   (+) Primary osteoarthritis of both knees      NEURO/PSYCH   (+) Hx of adenomatous colonic polyps      PULMONARY   (+) Extrinsic asthma without complication      Digestive   (+) Ulcerative colitis without complications [facility])      Lab Results   Component Value Date    SODIUM 140 01/04/2023    K 3 7 01/04/2023     01/04/2023    CO2 29 01/04/2023    AGAP 9 01/04/2023    BUN 15 01/04/2023    CREATININE 0 81 01/04/2023    GLUC 86 11/26/2021    GLUF 101 (H) 01/04/2023    CALCIUM 8 7 01/04/2023    AST 15 01/04/2023    ALT 39 01/04/2023    ALKPHOS 87 01/04/2023    TP 7 3 01/04/2023    TBILI 0 76 01/04/2023    EGFR 90 01/04/2023     Lab Results   Component Value Date    WBC 8 24 01/04/2023    HGB 15 4 01/04/2023    HCT 45 8 01/04/2023    MCV 86 01/04/2023     01/04/2023 9/16/21 tte  LEFT VENTRICLE: Size was normal  Systolic function was normal  Ejection fraction was estimated to be 60 %  There were no regional wall motion abnormalities  Wall thickness was moderately increased  DOPPLER: Doppler parameters were  consistent with abnormal left ventricular relaxation (grade 1 diastolic dysfunction)      RIGHT VENTRICLE: The size was normal  Systolic function was low normal  Wall thickness was normal      LEFT ATRIUM: The atrium was mildly dilated      RIGHT ATRIUM: Size was normal      MITRAL VALVE: Valve structure was normal  There was normal leaflet separation  DOPPLER: The transmitral velocity was within the normal range  There was no evidence for stenosis  There was mild regurgitation      AORTIC VALVE: The valve was trileaflet  Leaflets exhibited normal thickness, mild calcification, and normal cuspal separation  DOPPLER: Transaortic velocity was within the normal range  There was no evidence for stenosis   There was trace  regurgitation      TRICUSPID VALVE: The valve structure was normal  There was normal leaflet separation  DOPPLER: The transtricuspid velocity was within the normal range  There was no evidence for stenosis  There was mild regurgitation      PULMONIC VALVE: Leaflets exhibited normal thickness, no calcification, and normal cuspal separation  DOPPLER: The transpulmonic velocity was within the normal range  There was trace regurgitation      PERICARDIUM: There was no pericardial effusion  The pericardium was normal in appearance      AORTA: The root exhibited mild dilatation  (4 1 cm)     SYSTEMIC VEINS: IVC: The inferior vena cava was not well visualized  Physical Exam    Airway    Mallampati score: II  TM Distance: >3 FB  Neck ROM: full     Dental   No notable dental hx     Cardiovascular      Pulmonary      Other Findings        Anesthesia Plan  ASA Score- 2     Anesthesia Type- IV sedation with anesthesia with ASA Monitors  Additional Monitors:   Airway Plan:           Plan Factors-Exercise tolerance (METS): >4 METS  Chart reviewed  EKG reviewed  Imaging results reviewed  Existing labs reviewed  Patient summary reviewed  Induction- intravenous  Postoperative Plan-     Informed Consent- Anesthetic plan and risks discussed with patient  I personally reviewed this patient with the CRNA  Discussed and agreed on the Anesthesia Plan with the CRNA  Shannon Ibrahim

## 2023-03-09 NOTE — H&P
History and Physical -  [facility]  Sherman Holley 81 y o  male MRN: [id number]                  HPI: Sherman Holley is a 81y o  year old male who presents for colonoscopy due to history of proctosigmoiditis and colon polyps  REVIEW OF SYSTEMS: Per the HPI, and otherwise unremarkable      Historical Information   Past Medical History:   Diagnosis Date   • Arthritis    • Asthma    • Colon polyp    • GERD (gastroesophageal reflux disease)     occasional dependent on food choice-today feels well 3/9/23   • Hyperlipidemia    • Hypertension    • Tinnitus    • Ulcerative colitis ([facility] [address] )    • Vertigo     feels well today 3/9/23     Past Surgical History:   Procedure Laterality Date   • COLONOSCOPY     • ROTATOR CUFF REPAIR Right    • TONSILLECTOMY       Social History   Social History     Substance and Sexual Activity   Alcohol Use Not Currently    Comment: rarely     Social History     Substance and Sexual Activity   Drug Use Never     Social History     Tobacco Use   Smoking Status Former   • Packs/day: 1 50   • Years: 7 00   • Pack years: 10 50   • Types: Cigarettes   • Quit date: 10/14/1980   • Years since quittin 4   Smokeless Tobacco Never     Family History   Problem Relation Age of Onset   • Crohn's disease Cousin        Meds/Allergies       Current Outpatient Medications:   •  amLODIPine (NORVASC) 5 mg tablet  •  benazepril (LOTENSIN) 20 mg tablet  •  Coenzyme Q10 (CO Q-10 PO)  •  fluticasone (FLONASE) 50 mcg/act nasal spray  •  FOLIC ACID PO  •  hydrochlorothiazide (HYDRODIURIL) 12 5 mg tablet  •  mesalamine (ASACOL) 800 MG EC tablet  •  multivitamin (THERAGRAN) TABS  •  rosuvastatin (CRESTOR) 10 MG tablet  •  UBIQUINOL PO  •  albuterol (5 mg/mL) 0 5 % nebulizer solution  •  albuterol (PROVENTIL HFA,VENTOLIN HFA) 90 mcg/act inhaler    No Known Allergies    Objective     /77   Pulse 94   Temp (!) 97 3 °F (36 3 °C) (Skin)   Resp 18   Ht 5' 10" (1 778 m)   Wt 110 kg (243 lb)   SpO2 97%   BMI 34 87 kg/m²       PHYSICAL EXAM    Gen: NAD  Head: NCAT  CV: RRR  CHEST: Clear  ABD: soft, NT/ND  EXT: no edema      ASSESSMENT/PLAN:  This is a 81y o  year old male here for colonoscopy, and he is stable and optimized for his procedure

## 2023-03-09 NOTE — ANESTHESIA POSTPROCEDURE EVALUATION
Post-Op Assessment Note    CV Status:  Stable    Pain management: adequate     Mental Status:  Sleepy   Hydration Status:  Euvolemic   PONV Controlled:  Controlled   Airway Patency:  Patent      Post Op Vitals Reviewed: Yes      Staff: CRNA         No notable events documented      /75 (03/09/23 0752)    Temp     Pulse 74 (03/09/23 0752)   Resp 16 (03/09/23 0752)    SpO2 96 % (03/09/23 0752)

## 2023-03-10 DIAGNOSIS — E78.2 MIXED HYPERLIPIDEMIA: ICD-10-CM

## 2023-03-10 DIAGNOSIS — I10 ESSENTIAL HYPERTENSION: ICD-10-CM

## 2023-03-10 RX ORDER — BENAZEPRIL HYDROCHLORIDE 20 MG/1
TABLET ORAL
Qty: 90 TABLET | Refills: 1 | Status: SHIPPED | OUTPATIENT
Start: 2023-03-10

## 2023-03-10 RX ORDER — ROSUVASTATIN CALCIUM 10 MG/1
TABLET, COATED ORAL
Qty: 90 TABLET | Refills: 1 | Status: SHIPPED | OUTPATIENT
Start: 2023-03-10

## 2023-03-15 ENCOUNTER — LAB (OUTPATIENT)
Dept: LAB | Facility: HOSPITAL | Age: 73
End: 2023-03-15

## 2023-03-15 DIAGNOSIS — K51.00 ULCERATIVE PANCOLITIS WITHOUT COMPLICATION (HCC): ICD-10-CM

## 2023-03-15 LAB
ALBUMIN SERPL BCP-MCNC: 3.9 G/DL (ref 3.5–5)
ALP SERPL-CCNC: 69 U/L (ref 34–104)
ALT SERPL W P-5'-P-CCNC: 27 U/L (ref 7–52)
ANION GAP SERPL CALCULATED.3IONS-SCNC: 5 MMOL/L (ref 4–13)
AST SERPL W P-5'-P-CCNC: 16 U/L (ref 13–39)
BASOPHILS # BLD AUTO: 0.05 THOUSANDS/ÂΜL (ref 0–0.1)
BASOPHILS NFR BLD AUTO: 1 % (ref 0–1)
BILIRUB SERPL-MCNC: 0.71 MG/DL (ref 0.2–1)
BUN SERPL-MCNC: 16 MG/DL (ref 5–25)
CALCIUM SERPL-MCNC: 9.1 MG/DL (ref 8.4–10.2)
CHLORIDE SERPL-SCNC: 103 MMOL/L (ref 96–108)
CO2 SERPL-SCNC: 31 MMOL/L (ref 21–32)
CREAT SERPL-MCNC: 0.8 MG/DL (ref 0.6–1.3)
CRP SERPL QL: 2.8 MG/L
EOSINOPHIL # BLD AUTO: 0.13 THOUSAND/ÂΜL (ref 0–0.61)
EOSINOPHIL NFR BLD AUTO: 2 % (ref 0–6)
ERYTHROCYTE [DISTWIDTH] IN BLOOD BY AUTOMATED COUNT: 13.5 % (ref 11.6–15.1)
GFR SERPL CREATININE-BSD FRML MDRD: 90 ML/MIN/1.73SQ M
GLUCOSE P FAST SERPL-MCNC: 98 MG/DL (ref 65–99)
HCT VFR BLD AUTO: 45.8 % (ref 36.5–49.3)
HGB BLD-MCNC: 15.2 G/DL (ref 12–17)
IMM GRANULOCYTES # BLD AUTO: 0.04 THOUSAND/UL (ref 0–0.2)
IMM GRANULOCYTES NFR BLD AUTO: 1 % (ref 0–2)
LYMPHOCYTES # BLD AUTO: 1.97 THOUSANDS/ÂΜL (ref 0.6–4.47)
LYMPHOCYTES NFR BLD AUTO: 23 % (ref 14–44)
MCH RBC QN AUTO: 29.3 PG (ref 26.8–34.3)
MCHC RBC AUTO-ENTMCNC: 33.2 G/DL (ref 31.4–37.4)
MCV RBC AUTO: 88 FL (ref 82–98)
MONOCYTES # BLD AUTO: 0.67 THOUSAND/ÂΜL (ref 0.17–1.22)
MONOCYTES NFR BLD AUTO: 8 % (ref 4–12)
NEUTROPHILS # BLD AUTO: 5.64 THOUSANDS/ÂΜL (ref 1.85–7.62)
NEUTS SEG NFR BLD AUTO: 65 % (ref 43–75)
NRBC BLD AUTO-RTO: 0 /100 WBCS
PLATELET # BLD AUTO: 264 THOUSANDS/UL (ref 149–390)
PMV BLD AUTO: 10.2 FL (ref 8.9–12.7)
POTASSIUM SERPL-SCNC: 3.7 MMOL/L (ref 3.5–5.3)
PROT SERPL-MCNC: 7 G/DL (ref 6.4–8.4)
RBC # BLD AUTO: 5.19 MILLION/UL (ref 3.88–5.62)
SODIUM SERPL-SCNC: 139 MMOL/L (ref 135–147)
WBC # BLD AUTO: 8.5 THOUSAND/UL (ref 4.31–10.16)

## 2023-03-16 NOTE — RESULT ENCOUNTER NOTE
Please call the patient regarding his abnormal result  Distant with colitis    Patient is to follow-up with repeat flex sig in 6 months follow-up office visit in the meantime

## 2023-04-24 ENCOUNTER — TELEPHONE (OUTPATIENT)
Dept: GASTROENTEROLOGY | Facility: CLINIC | Age: 73
End: 2023-04-24

## 2023-04-24 DIAGNOSIS — K51.00 ULCERATIVE PANCOLITIS WITHOUT COMPLICATION (HCC): ICD-10-CM

## 2023-04-24 RX ORDER — MESALAMINE 800 MG/1
TABLET, DELAYED RELEASE ORAL
Qty: 180 TABLET | Refills: 2 | Status: SHIPPED | OUTPATIENT
Start: 2023-04-24

## 2023-04-24 NOTE — TELEPHONE ENCOUNTER
Patients GI provider:  Dr Jose Carey    Number to return call: [phone]    Reason for call: Pt calling because he states Dr Jose Carey told him to increase his mesalamine from twice and day to 3 times a day  Pt states prescription was sent to the pharmacy for twice a day   He needs a new prescription sent for the 3 times a day    Scheduled procedure/appointment date if applicable: Appt 5/45/24

## 2023-05-12 ENCOUNTER — OFFICE VISIT (OUTPATIENT)
Dept: FAMILY MEDICINE CLINIC | Facility: CLINIC | Age: 73
End: 2023-05-12

## 2023-05-12 VITALS
SYSTOLIC BLOOD PRESSURE: 122 MMHG | OXYGEN SATURATION: 94 % | TEMPERATURE: 96.7 F | HEIGHT: 70 IN | DIASTOLIC BLOOD PRESSURE: 82 MMHG | BODY MASS INDEX: 35.36 KG/M2 | HEART RATE: 73 BPM | WEIGHT: 247 LBS

## 2023-05-12 DIAGNOSIS — E66.01 OBESITY, MORBID (HCC): ICD-10-CM

## 2023-05-12 DIAGNOSIS — J30.2 SEASONAL ALLERGIES: ICD-10-CM

## 2023-05-12 DIAGNOSIS — K51.00 ULCERATIVE PANCOLITIS WITHOUT COMPLICATION (HCC): ICD-10-CM

## 2023-05-12 DIAGNOSIS — J45.20 MILD INTERMITTENT EXTRINSIC ASTHMA WITHOUT COMPLICATION: ICD-10-CM

## 2023-05-12 DIAGNOSIS — I10 ESSENTIAL HYPERTENSION: Primary | ICD-10-CM

## 2023-05-12 RX ORDER — ALBUTEROL SULFATE 90 UG/1
2 AEROSOL, METERED RESPIRATORY (INHALATION) EVERY 6 HOURS PRN
Qty: 8 G | Refills: 2 | Status: SHIPPED | OUTPATIENT
Start: 2023-05-12

## 2023-05-12 NOTE — PROGRESS NOTES
Name: Ольга Cowart      : 1950      MRN: [id number]  Encounter Provider: Enmanuel Roberto DO  Encounter Date: 2023   Encounter department: [place] [address] [address] [place] [address]     1  Essential hypertension  BP stable, well controlled  Continue current medications  2  Obesity, morbid ([address] )  Working on exercise and diet, especially carbohydrate control  3  Ulcerative pancolitis without complication (HCC)  Stable, following with GI      4  Mild intermittent extrinsic asthma without complication  -     albuterol (PROVENTIL HFA,VENTOLIN HFA) 90 mcg/act inhaler; Inhale 2 puffs every 6 (six) hours as needed for wheezing    5  Seasonal allergies  -     albuterol (PROVENTIL HFA,VENTOLIN HFA) 90 mcg/act inhaler; Inhale 2 puffs every 6 (six) hours as needed for wheezing       Subjective      HPI     Hypertension  Patient presents for follow-up of hypertension  Home blood pressure readings: usual 120s/80s  Patient is exercising (gyn 3-4 days per week, walking and the doing some weight machines for the last 1 month) and is adherent to low salt diet  Use of agents associated with hypertension include none  States that he has started paying more attention to his diet  Symptoms  [] Chest Pain  [] Dyspnea  [] Orthopnea  [] Blurred Vision  [] Palpitations  [] Headache  [] Peripheral Edema  [] Fatigue End Organ Damage  [] Hx of MI  [] Hx of Stroke  [] Hx of TIA  [] Heart Failure  [] LVH  [] CKD  [] PAD  [] Retinopathy Last BP's  BP Readings from Last 3 Encounters:   23 122/82   23 124/79   22 125/92        States that he has been having some wheezing for the last 2-3 weeks  Notes some itchy watery eyes  Taking Claritin and Flonase  Denies fever or chills       Review of Systems    Current Outpatient Medications on File Prior to Visit   Medication Sig   • amLODIPine (NORVASC) 5 mg tablet Take 1 tablet (5 mg total) by mouth daily   • benazepril "(LOTENSIN) 20 mg tablet TAKE 1 TABLET BY MOUTH EVERY DAY   • Coenzyme Q10 (CO Q-10 PO) Take by mouth   • fluticasone (FLONASE) 50 mcg/act nasal spray SPRAY 1 SPRAY INTO EACH NOSTRIL EVERY DAY   • FOLIC ACID PO Take by mouth   • hydrochlorothiazide (HYDRODIURIL) 12 5 mg tablet Take 1 tablet (12 5 mg total) by mouth daily   • mesalamine (ASACOL) 800 MG EC tablet TAKE 1 TABLET BY MOUTH 2 TIMES A DAY  • multivitamin (THERAGRAN) TABS Take 1 tablet by mouth daily   • rosuvastatin (CRESTOR) 10 MG tablet TAKE 1 TABLET BY MOUTH EVERY DAY   • UBIQUINOL PO Take by mouth   • albuterol (5 mg/mL) 0 5 % nebulizer solution Take 0 5 mL (2 5 mg total) by nebulization every 6 (six) hours as needed for wheezing (Patient not taking: Reported on 12/9/2022)   • hydrocortisone (CORTENEMA) 100 mg/60 mL enema Insert 60 mL (100 mg total) into the rectum daily at bedtime for 14 days   • [DISCONTINUED] albuterol (PROVENTIL HFA,VENTOLIN HFA) 90 mcg/act inhaler Inhale 2 puffs every 6 (six) hours as needed for wheezing   (Patient not taking: Reported on 8/4/2022)     Objective     /82 (BP Location: Left arm, Patient Position: Sitting, Cuff Size: Large)   Pulse 73   Temp (!) 96 7 °F (35 9 °C)   Ht 5' 10\" (1 778 m)   Wt 112 kg (247 lb)   SpO2 94%   BMI 35 44 kg/m²     Physical Exam  Vitals reviewed  Constitutional:       General: He is not in acute distress  Appearance: Normal appearance  HENT:      Head: Normocephalic and atraumatic  Right Ear: External ear normal       Left Ear: External ear normal       Nose: Nose normal       Mouth/Throat:      Mouth: Mucous membranes are moist    Eyes:      Extraocular Movements: Extraocular movements intact  Conjunctiva/sclera: Conjunctivae normal    Cardiovascular:      Rate and Rhythm: Normal rate and regular rhythm  Heart sounds: Normal heart sounds  Pulmonary:      Breath sounds: Normal breath sounds     Abdominal:      General: Bowel sounds are normal  There is no " distension  Palpations: Abdomen is soft  Tenderness: There is no abdominal tenderness  Musculoskeletal:      Right lower leg: No edema  Left lower leg: No edema  Skin:     General: Skin is warm  Capillary Refill: Capillary refill takes less than 2 seconds  Neurological:      Mental Status: He is alert  Mental status is at baseline           Real Lundberg DO

## 2023-06-21 ENCOUNTER — TELEPHONE (OUTPATIENT)
Dept: GASTROENTEROLOGY | Facility: CLINIC | Age: 73
End: 2023-06-21

## 2023-06-21 ENCOUNTER — OFFICE VISIT (OUTPATIENT)
Dept: GASTROENTEROLOGY | Facility: CLINIC | Age: 73
End: 2023-06-21
Payer: COMMERCIAL

## 2023-06-21 VITALS
HEART RATE: 72 BPM | DIASTOLIC BLOOD PRESSURE: 84 MMHG | SYSTOLIC BLOOD PRESSURE: 136 MMHG | WEIGHT: 241 LBS | BODY MASS INDEX: 34.5 KG/M2 | HEIGHT: 70 IN

## 2023-06-21 DIAGNOSIS — K51.00 ULCERATIVE PANCOLITIS WITHOUT COMPLICATION (HCC): ICD-10-CM

## 2023-06-21 DIAGNOSIS — K63.89 PROCTOSIGMOIDITIS: Primary | ICD-10-CM

## 2023-06-21 DIAGNOSIS — D12.6 ADENOMATOUS POLYP OF COLON, UNSPECIFIED PART OF COLON: ICD-10-CM

## 2023-06-21 PROCEDURE — 99213 OFFICE O/P EST LOW 20 MIN: CPT | Performed by: PHYSICIAN ASSISTANT

## 2023-06-21 RX ORDER — HYDROCORTISONE 100 MG/60ML
100 SUSPENSION RECTAL
Qty: 21 ENEMA | Refills: 1 | Status: SHIPPED | OUTPATIENT
Start: 2023-06-21 | End: 2023-07-12

## 2023-06-21 RX ORDER — MESALAMINE 800 MG/1
1600 TABLET, DELAYED RELEASE ORAL 2 TIMES DAILY
Qty: 360 TABLET | Refills: 2 | Status: SHIPPED | OUTPATIENT
Start: 2023-06-21

## 2023-06-21 NOTE — PROGRESS NOTES
[facility] - Outpatient Follow-up Note  Joan Mccurdy 72 y o  male MRN: [id number]  Encounter: [id number]          ASSESSMENT AND PLAN:      1  Proctosigmoiditis  Patient with history of ulcerative colitis with active sigmoiditis on prior colonoscopy and patient was treated with steroid enemas and did have temporary improvement but now states that he seems to notice more mucus per rectum and increased frequency generally is going 4-5 times per day but baseline is 2-3 times per day  Would recommend to go back on the steroid enemas for short course and he can take this for 2 weeks followed by tapering every other day to complete the course and then he should continue on increased dose of mesalamine and repeat flexible sigmoidoscopy planned in September  Advised to call in the interim if things do not improve  2  Adenomatous polyp of colon, unspecified part of colon  Flex sig planned in the fall as above    ______________________________________________________________________      SUBJECTIVE:      This is a 51-year-old male who presents for follow-up and underwent colonoscopy in March of this year and was noted to have active inflammation of the sigmoid and patient was ordered steroid enemas for 14 days and mesalamine was increased and he presents today for a follow-up  Pathology at that time had shown features of chronic mucosal injury  CRP was performed in March of this year which was normal     Patient is reporting that he is doing well but he is having increased bowel frequency and mucus  He states that he took the enemas for 14 days and then he was taking the mesalamine 800 mg 2 in the morning and 1 at night but now he is just taking 1 and 1 and he states that he has noticed that he is going about 5 times a day but no rectal bleeding  Plan was for repeat flex sig in the fall  Patient denies any blood    Patient reports that he may have had some dietary indiscretion "lately and thinks that this may have given him some flare symptoms  REVIEW OF SYSTEMS IS OTHERWISE NEGATIVE  Historical Information   Past Medical History:   Diagnosis Date   • Arthritis    • Asthma    • Colon polyp    • GERD (gastroesophageal reflux disease)     occasional dependent on food choice-today feels well 3/9/23   • Hyperlipidemia    • Hypertension    • Tinnitus    • Ulcerative colitis ([facility] [address] )    • Vertigo     feels well today 3/9/23     Past Surgical History:   Procedure Laterality Date   • COLONOSCOPY     • ROTATOR CUFF REPAIR Right    • TONSILLECTOMY       Social History   Social History     Substance and Sexual Activity   Alcohol Use Not Currently    Comment: rarely     Social History     Substance and Sexual Activity   Drug Use Never     Social History     Tobacco Use   Smoking Status Former   • Packs/day: 1 50   • Years: 7 00   • Total pack years: 10 50   • Types: Cigarettes   • Quit date: 10/14/1980   • Years since quittin 7   Smokeless Tobacco Never     Family History   Problem Relation Age of Onset   • Crohn's disease Cousin        Meds/Allergies       Current Outpatient Medications:   •  albuterol (PROVENTIL HFA,VENTOLIN HFA) 90 mcg/act inhaler  •  amLODIPine (NORVASC) 5 mg tablet  •  benazepril (LOTENSIN) 20 mg tablet  •  Coenzyme Q10 (CO Q-10 PO)  •  fluticasone (FLONASE) 50 mcg/act nasal spray  •  FOLIC ACID PO  •  hydrochlorothiazide (HYDRODIURIL) 12 5 mg tablet  •  mesalamine (ASACOL) 800 MG EC tablet  •  multivitamin (THERAGRAN) TABS  •  rosuvastatin (CRESTOR) 10 MG tablet  •  UBIQUINOL PO  •  albuterol (5 mg/mL) 0 5 % nebulizer solution  •  hydrocortisone (CORTENEMA) 100 mg/60 mL enema    No Known Allergies        Objective     Blood pressure 136/84, pulse 72, height 5' 10\" (1 778 m), weight 109 kg (241 lb)  Body mass index is 34 58 kg/m²        PHYSICAL EXAM:      General Appearance:   Alert, cooperative, no distress   HEENT:   Normocephalic, atraumatic, anicteric    " Neck:  Supple, symmetrical, trachea midline   Lungs:   Clear to auscultation bilaterally; no rales, rhonchi or wheezing; respirations unlabored    Heart[de-identified]   Regular rate and rhythm; no murmur, rub, or gallop  Abdomen:   Soft, non-tender, non-distended; normal bowel sounds; no masses, no organomegaly    Genitalia:   Deferred    Rectal:   Deferred    Extremities:  No cyanosis, clubbing or edema    Pulses:  2+ and symmetric    Skin:  No jaundice, rashes, or lesions    Lymph nodes:  No palpable cervical lymphadenopathy        Lab Results:   No visits with results within 1 Day(s) from this visit     Latest known visit with results is:   Lab on 03/15/2023   Component Date Value   • Sodium 03/15/2023 139    • Potassium 03/15/2023 3 7    • Chloride 03/15/2023 103    • CO2 03/15/2023 31    • ANION GAP 03/15/2023 5    • BUN 03/15/2023 16    • Creatinine 03/15/2023 0 80    • Glucose, Fasting 03/15/2023 98    • Calcium 03/15/2023 9 1    • AST 03/15/2023 16    • ALT 03/15/2023 27    • Alkaline Phosphatase 03/15/2023 69    • Total Protein 03/15/2023 7 0    • Albumin 03/15/2023 3 9    • Total Bilirubin 03/15/2023 0 71    • eGFR 03/15/2023 90    • WBC 03/15/2023 8 50    • RBC 03/15/2023 5 19    • Hemoglobin 03/15/2023 15 2    • Hematocrit 03/15/2023 45 8    • MCV 03/15/2023 88    • MCH 03/15/2023 29 3    • MCHC 03/15/2023 33 2    • RDW 03/15/2023 13 5    • MPV 03/15/2023 10 2    • Platelets 43/20/1887 264    • nRBC 03/15/2023 0    • Neutrophils Relative 03/15/2023 65    • Immat GRANS % 03/15/2023 1    • Lymphocytes Relative 03/15/2023 23    • Monocytes Relative 03/15/2023 8    • Eosinophils Relative 03/15/2023 2    • Basophils Relative 03/15/2023 1    • Neutrophils Absolute 03/15/2023 5 64    • Immature Grans Absolute 03/15/2023 0 04    • Lymphocytes Absolute 03/15/2023 1 97    • Monocytes Absolute 03/15/2023 0 67    • Eosinophils Absolute 03/15/2023 0 13    • Basophils Absolute 03/15/2023 0 05    • CRP 03/15/2023 2 8 Radiology Results:   No results found

## 2023-06-21 NOTE — TELEPHONE ENCOUNTER
Per Nereyda Akers, pt needs to be scheduled for flexsig in mid Sept, however, schedule is not open yet  I told pt that I will watch for schedule to open and will call him to schedule once does

## 2023-06-26 ENCOUNTER — RA CDI HCC (OUTPATIENT)
Dept: OTHER | Facility: HOSPITAL | Age: 73
End: 2023-06-26

## 2023-06-26 NOTE — PROGRESS NOTES
[facility]  coding opportunities       Chart reviewed, no opportunity found:   [address]        Patients Insurance     Medicare Insurance: [employer]

## 2023-07-05 ENCOUNTER — OFFICE VISIT (OUTPATIENT)
Dept: FAMILY MEDICINE CLINIC | Facility: CLINIC | Age: 73
End: 2023-07-05
Payer: COMMERCIAL

## 2023-07-05 VITALS
BODY MASS INDEX: 33.93 KG/M2 | HEIGHT: 70 IN | SYSTOLIC BLOOD PRESSURE: 124 MMHG | TEMPERATURE: 97.1 F | WEIGHT: 237 LBS | OXYGEN SATURATION: 97 % | DIASTOLIC BLOOD PRESSURE: 80 MMHG | HEART RATE: 83 BPM

## 2023-07-05 DIAGNOSIS — J45.20 MILD INTERMITTENT EXTRINSIC ASTHMA WITHOUT COMPLICATION: Primary | ICD-10-CM

## 2023-07-05 DIAGNOSIS — K51.90 ULCERATIVE COLITIS WITHOUT COMPLICATIONS, UNSPECIFIED LOCATION (HCC): ICD-10-CM

## 2023-07-05 DIAGNOSIS — I10 ESSENTIAL HYPERTENSION: ICD-10-CM

## 2023-07-05 PROCEDURE — 3725F SCREEN DEPRESSION PERFORMED: CPT | Performed by: FAMILY MEDICINE

## 2023-07-05 PROCEDURE — 99214 OFFICE O/P EST MOD 30 MIN: CPT | Performed by: FAMILY MEDICINE

## 2023-07-05 PROCEDURE — 3074F SYST BP LT 130 MM HG: CPT | Performed by: FAMILY MEDICINE

## 2023-07-05 PROCEDURE — 3079F DIAST BP 80-89 MM HG: CPT | Performed by: FAMILY MEDICINE

## 2023-07-05 PROCEDURE — 1159F MED LIST DOCD IN RCRD: CPT | Performed by: FAMILY MEDICINE

## 2023-07-05 PROCEDURE — 1160F RVW MEDS BY RX/DR IN RCRD: CPT | Performed by: FAMILY MEDICINE

## 2023-07-05 NOTE — PROGRESS NOTES
Name: Yuri Santamaria      : 1950      MRN: [id number]  Encounter Provider: Ann Romo DO  Encounter Date: 2023   Encounter department: [address] [place] [address] [facility]     1. Mild intermittent extrinsic asthma without complication  Stable, continue with albuterol PRN. 2. Essential hypertension  Stable, continue with amlodipine 5 mg, benzapril 20 mg and HCTZ 12.5 mg.     3. BMI 34.0-34.9,adult  Continue working on diet and exercise. 4. Ulcerative colitis without complications, unspecified location ([address] )  Recentyl increased Mesalamine with GI due to proctosigmoiditis. Has upcoming colonoscopy in November. Continue with Mesalamine, management per GI. Subjective      HPI     Patient presents to the office for follow up. Notes that he has been feeling a lot better. Reports that the inhaler has helped with his breathing a lot. Notes that he has been going to the gym for about 1 hours per day 4-5 days per week. He is down about 10 lbs (weight was 248 lbs before he started exercising). BP has been 120s/70s.       Review of Systems    Current Outpatient Medications on File Prior to Visit   Medication Sig   • albuterol (PROVENTIL HFA,VENTOLIN HFA) 90 mcg/act inhaler Inhale 2 puffs every 6 (six) hours as needed for wheezing   • amLODIPine (NORVASC) 5 mg tablet Take 1 tablet (5 mg total) by mouth daily   • benazepril (LOTENSIN) 20 mg tablet TAKE 1 TABLET BY MOUTH EVERY DAY   • Coenzyme Q10 (CO Q-10 PO) Take by mouth   • fluticasone (FLONASE) 50 mcg/act nasal spray SPRAY 1 SPRAY INTO EACH NOSTRIL EVERY DAY   • FOLIC ACID PO Take by mouth   • hydrochlorothiazide (HYDRODIURIL) 12.5 mg tablet Take 1 tablet (12.5 mg total) by mouth daily   • hydrocortisone (CORTENEMA) 100 mg/60 mL enema Insert 60 mL (100 mg total) into the rectum daily at bedtime for 21 doses   • mesalamine (ASACOL) 800 MG EC tablet Take 2 tablets (1,600 mg total) by mouth 2 (two) times a day   • multivitamin (THERAGRAN) TABS Take 1 tablet by mouth daily   • rosuvastatin (CRESTOR) 10 MG tablet TAKE 1 TABLET BY MOUTH EVERY DAY   • UBIQUINOL PO Take by mouth   • albuterol (5 mg/mL) 0.5 % nebulizer solution Take 0.5 mL (2.5 mg total) by nebulization every 6 (six) hours as needed for wheezing (Patient not taking: Reported on 12/9/2022)     Objective     /80 (BP Location: Left arm, Patient Position: Sitting, Cuff Size: Adult)   Pulse 83   Temp (!) 97.1 °F (36.2 °C) (Tympanic)   Ht 5' 10" (1.778 m)   Wt 108 kg (237 lb)   SpO2 97%   BMI 34.01 kg/m²     Physical Exam  Vitals reviewed. Constitutional:       General: He is not in acute distress. Appearance: Normal appearance. He is not ill-appearing. HENT:      Head: Normocephalic and atraumatic. Right Ear: External ear normal.      Left Ear: External ear normal.      Nose: Nose normal.      Mouth/Throat:      Mouth: Mucous membranes are moist.   Eyes:      Extraocular Movements: Extraocular movements intact. Conjunctiva/sclera: Conjunctivae normal.   Cardiovascular:      Rate and Rhythm: Normal rate and regular rhythm. Pulmonary:      Effort: Pulmonary effort is normal. No respiratory distress. Breath sounds: No wheezing, rhonchi or rales. Abdominal:      General: Bowel sounds are normal.      Palpations: Abdomen is soft. Musculoskeletal:      Right lower leg: No edema. Left lower leg: No edema. Skin:     General: Skin is warm. Neurological:      General: No focal deficit present. Mental Status: He is alert. Mental status is at baseline.         Penelope Trujillo DO

## 2023-07-06 ENCOUNTER — TELEPHONE (OUTPATIENT)
Age: 73
End: 2023-07-06

## 2023-07-06 NOTE — TELEPHONE ENCOUNTER
[facility] Assessment    Name: Erika Nguyen  YOB: 1950  Last Height: 5' 10" (1.778 m)  Last weight: 108 kg (237 lb)  BMI: 34.01 kg/m²  Procedure: Flex Sig  Diagnosis:  Proctosigmoiditis, Hx of Polyps  Date of procedure: 9/13/23  Prep: yes  Responsible : yes  Phone#: ?  Name completing form: Lyndsaywendy Ross  Date form completed: 07/06/23      If the patient answers yes to any of these questions, schedule in a hospital  Are you pregnant: No  Do you rely on a wheelchair for mobility: No  Have you been diagnosed with End Stage Renal Disease (ESRD): No  Do you need oxygen during the day: No  Have you had a heart attack or stroke within the past three months: No  Have you had a seizure within the past three months: No  Have you ever been informed by anesthesia that you have a difficult airway: No  Additional Questions  Have you had any cardiac testing or are under the care of a Cardiologist (see cardiac list): No  Cardiac list:   Do you have an implanted cardiac defibrillator: No (Comment:  This patient should be scheduled in the hospital)    Have any bleeding problems, such as anemia or hemophilia (If patient has H&H result below 8, schedule in hospital.  H&H must be within 30 days of procedure): No    Had an organ transplant within the past 3 months: No    Do you have any present infections: No  Do you get short of breath when walking a few blocks: No  Have you been diagnosed with diabetes: No  Comments (provide cardiac provider information if applicable):

## 2023-07-06 NOTE — TELEPHONE ENCOUNTER
Scheduled date of sigmoidoscopy (as of today): 9/13/23    Physician performing sigmoidoscopy:Vin    Location: [facility]    Instructions reviewed with patient by: Mailed to pt

## 2023-07-10 DIAGNOSIS — I10 ESSENTIAL HYPERTENSION: ICD-10-CM

## 2023-07-10 RX ORDER — HYDROCHLOROTHIAZIDE 12.5 MG/1
TABLET ORAL
Qty: 90 TABLET | Refills: 1 | Status: SHIPPED | OUTPATIENT
Start: 2023-07-10

## 2023-08-14 DIAGNOSIS — I10 ESSENTIAL HYPERTENSION: ICD-10-CM

## 2023-08-14 RX ORDER — AMLODIPINE BESYLATE 5 MG/1
5 TABLET ORAL DAILY
Qty: 90 TABLET | Refills: 1 | Status: SHIPPED | OUTPATIENT
Start: 2023-08-14

## 2023-09-06 ENCOUNTER — TELEPHONE (OUTPATIENT)
Dept: GASTROENTEROLOGY | Facility: CLINIC | Age: 73
End: 2023-09-06

## 2023-09-06 NOTE — TELEPHONE ENCOUNTER
Left message reminding pt of his flex sig on 9/13 with Dr. Sharlene Jacinto. He will need a , his instructions were mailed and they will call him day prior with his arrival time.

## 2023-09-11 NOTE — TELEPHONE ENCOUNTER
Pt called in had to can procedure due to transportation issue please call back to re chantel  GI SIGMOIDOTOMY

## 2023-10-09 ENCOUNTER — TELEPHONE (OUTPATIENT)
Age: 73
End: 2023-10-09

## 2023-10-09 NOTE — TELEPHONE ENCOUNTER
Patients GI provider:  Jaclyn Foley    Number to return call: [phone]    Reason for call: Pt calling to reschedule his Flexible Sigmoidoscopy. He has a ride on 11/9, 11/15, 12/8 & 12/9 and is requesting a late morning early afternoon on one of those dates. Please contact pt to schedule.      Scheduled procedure/appointment date if applicable: N/A

## 2023-10-16 ENCOUNTER — TELEPHONE (OUTPATIENT)
Dept: GASTROENTEROLOGY | Facility: CLINIC | Age: 73
End: 2023-10-16

## 2023-10-16 DIAGNOSIS — I10 ESSENTIAL HYPERTENSION: ICD-10-CM

## 2023-10-16 RX ORDER — BENAZEPRIL HYDROCHLORIDE 20 MG/1
TABLET ORAL
Qty: 90 TABLET | Refills: 1 | Status: SHIPPED | OUTPATIENT
Start: 2023-10-16

## 2023-10-16 NOTE — TELEPHONE ENCOUNTER
Scheduled date of sigmoidoscopy (as of today):12/13/23  Physician performing sigmoidoscopy:Dr. Angelika Hughes  Instructions reviewed with patient by:ti  Clearances:  n/a

## 2023-11-20 DIAGNOSIS — J30.2 SEASONAL ALLERGIES: ICD-10-CM

## 2023-11-20 DIAGNOSIS — J45.20 MILD INTERMITTENT EXTRINSIC ASTHMA WITHOUT COMPLICATION: ICD-10-CM

## 2023-11-20 RX ORDER — ALBUTEROL SULFATE 90 UG/1
2 AEROSOL, METERED RESPIRATORY (INHALATION) EVERY 6 HOURS PRN
Qty: 8 G | Refills: 2 | Status: SHIPPED | OUTPATIENT
Start: 2023-11-20

## 2023-11-25 DIAGNOSIS — E78.2 MIXED HYPERLIPIDEMIA: ICD-10-CM

## 2023-11-26 RX ORDER — ROSUVASTATIN CALCIUM 10 MG/1
TABLET, COATED ORAL
Qty: 90 TABLET | Refills: 1 | Status: SHIPPED | OUTPATIENT
Start: 2023-11-26

## 2023-11-29 ENCOUNTER — ANESTHESIA EVENT (OUTPATIENT)
Dept: ANESTHESIOLOGY | Facility: AMBULATORY SURGERY CENTER | Age: 73
End: 2023-11-29

## 2023-11-29 ENCOUNTER — ANESTHESIA (OUTPATIENT)
Dept: ANESTHESIOLOGY | Facility: AMBULATORY SURGERY CENTER | Age: 73
End: 2023-11-29

## 2023-12-05 ENCOUNTER — TELEPHONE (OUTPATIENT)
Dept: GASTROENTEROLOGY | Facility: CLINIC | Age: 73
End: 2023-12-05

## 2023-12-05 NOTE — TELEPHONE ENCOUNTER
Confirmed patient's flexible sigmoidoscopy. He has a  and will be called two days prior to procedure. He will check at home to see if he has the instructions. He will let us know if he doesn't.

## 2023-12-06 ENCOUNTER — OFFICE VISIT (OUTPATIENT)
Dept: FAMILY MEDICINE CLINIC | Facility: CLINIC | Age: 73
End: 2023-12-06
Payer: COMMERCIAL

## 2023-12-06 VITALS
OXYGEN SATURATION: 96 % | HEIGHT: 70 IN | WEIGHT: 234 LBS | BODY MASS INDEX: 33.5 KG/M2 | TEMPERATURE: 97.6 F | DIASTOLIC BLOOD PRESSURE: 76 MMHG | HEART RATE: 82 BPM | SYSTOLIC BLOOD PRESSURE: 124 MMHG

## 2023-12-06 DIAGNOSIS — Z12.5 PROSTATE CANCER SCREENING: ICD-10-CM

## 2023-12-06 DIAGNOSIS — K51.90 ULCERATIVE COLITIS WITHOUT COMPLICATIONS, UNSPECIFIED LOCATION (HCC): ICD-10-CM

## 2023-12-06 DIAGNOSIS — I10 ESSENTIAL HYPERTENSION: ICD-10-CM

## 2023-12-06 DIAGNOSIS — Z00.00 ENCOUNTER FOR MEDICARE ANNUAL WELLNESS EXAM: Primary | ICD-10-CM

## 2023-12-06 PROBLEM — E66.811 OBESITY (BMI 30.0-34.9): Status: ACTIVE | Noted: 2023-05-12

## 2023-12-06 PROBLEM — E66.9 OBESITY (BMI 30.0-34.9): Status: ACTIVE | Noted: 2023-05-12

## 2023-12-06 PROCEDURE — G0439 PPPS, SUBSEQ VISIT: HCPCS | Performed by: FAMILY MEDICINE

## 2023-12-06 NOTE — PATIENT INSTRUCTIONS
Medicare Preventive Visit Patient Instructions  Thank you for completing your Welcome to Medicare Visit or Medicare Annual Wellness Visit today. Your next wellness visit will be due in one year (12/6/2024). The screening/preventive services that you may require over the next 5-10 years are detailed below. Some tests may not apply to you based off risk factors and/or age. Screening tests ordered at today's visit but not completed yet may show as past due. Also, please note that scanned in results may not display below. Preventive Screenings:  Service Recommendations Previous Testing/Comments   Colorectal Cancer Screening  Colonoscopy    Fecal Occult Blood Test (FOBT)/Fecal Immunochemical Test (FIT)  Fecal DNA/Cologuard Test  Flexible Sigmoidoscopy Age: 43-73 years old   Colonoscopy: every 10 years (May be performed more frequently if at higher risk)  OR  FOBT/FIT: every 1 year  OR  Cologuard: every 3 years  OR  Sigmoidoscopy: every 5 years  Screening may be recommended earlier than age 39 if at higher risk for colorectal cancer. Also, an individualized decision between you and your healthcare provider will decide whether screening between the ages of 77-80 would be appropriate.  Colonoscopy: 03/09/2023  FOBT/FIT: Not on file  Cologuard: Not on file  Sigmoidoscopy: Not on file    Screening Current     Prostate Cancer Screening Individualized decision between patient and health care provider in men between ages of 53-66   Medicare will cover every 12 months beginning on the day after your 50th birthday PSA: 1.8 ng/mL     Screening Current     Hepatitis C Screening Once for adults born between 1945 and 1965  More frequently in patients at high risk for Hepatitis C Hep C Antibody: 06/14/2022    Screening Current   Diabetes Screening 1-2 times per year if you're at risk for diabetes or have pre-diabetes Fasting glucose: 98 mg/dL (3/15/2023)  A1C: No results in last 5 years (No results in last 5 years)  Screening Current Cholesterol Screening Once every 5 years if you don't have a lipid disorder. May order more often based on risk factors. Lipid panel: 01/04/2023  Screening Not Indicated  History Lipid Disorder      Other Preventive Screenings Covered by Medicare:  Abdominal Aortic Aneurysm (AAA) Screening: covered once if your at risk. You're considered to be at risk if you have a family history of AAA or a male between the age of 70-76 who smoking at least 100 cigarettes in your lifetime. Lung Cancer Screening: covers low dose CT scan once per year if you meet all of the following conditions: (1) Age 48-67; (2) No signs or symptoms of lung cancer; (3) Current smoker or have quit smoking within the last 15 years; (4) You have a tobacco smoking history of at least 20 pack years (packs per day x number of years you smoked); (5) You get a written order from a healthcare provider. Glaucoma Screening: covered annually if you're considered high risk: (1) You have diabetes OR (2) Family history of glaucoma OR (3)  aged 48 and older OR (3)  American aged 72 and older  Osteoporosis Screening: covered every 2 years if you meet one of the following conditions: (1) Have a vertebral abnormality; (2) On glucocorticoid therapy for more than 3 months; (3) Have primary hyperparathyroidism; (4) On osteoporosis medications and need to assess response to drug therapy. HIV Screening: covered annually if you're between the age of 14-79. Also covered annually if you are younger than 13 and older than 72 with risk factors for HIV infection. For pregnant patients, it is covered up to 3 times per pregnancy.     Immunizations:  Immunization Recommendations   Influenza Vaccine Annual influenza vaccination during flu season is recommended for all persons aged >= 6 months who do not have contraindications   Pneumococcal Vaccine   * Pneumococcal conjugate vaccine = PCV13 (Prevnar 13), PCV15 (Vaxneuvance), PCV20 (Prevnar 20)  * Pneumococcal polysaccharide vaccine = PPSV23 (Pneumovax) Adults 49-44 yo with certain risk factors or if 69+ yo  If never received any pneumonia vaccine: recommend Prevnar 20 (PCV20)  Give PCV20 if previously received 1 dose of PCV13 or PPSV23   Hepatitis B Vaccine 3 dose series if at intermediate or high risk (ex: diabetes, end stage renal disease, liver disease)   Respiratory syncytial virus (RSV) Vaccine - COVERED BY MEDICARE PART D  * RSVPreF3 (Arexvy) CDC recommends that adults 61years of age and older may receive a single dose of RSV vaccine using shared clinical decision-making (SCDM)   Tetanus (Td) Vaccine - COST NOT COVERED BY MEDICARE PART B Following completion of primary series, a booster dose should be given every 10 years to maintain immunity against tetanus. Td may also be given as tetanus wound prophylaxis. Tdap Vaccine - COST NOT COVERED BY MEDICARE PART B Recommended at least once for all adults. For pregnant patients, recommended with each pregnancy. Shingles Vaccine (Shingrix) - COST NOT COVERED BY MEDICARE PART B  2 shot series recommended in those 19 years and older who have or will have weakened immune systems or those 50 years and older     Health Maintenance Due:      Topic Date Due   • Colorectal Cancer Screening  09/05/2023   • Hepatitis C Screening  Completed     Immunizations Due:      Topic Date Due   • COVID-19 Vaccine (1) Never done   • Pneumococcal Vaccine: 65+ Years (1 - PCV) Never done   • Influenza Vaccine (1) 09/01/2023     Advance Directives   What are advance directives? Advance directives are legal documents that state your wishes and plans for medical care. These plans are made ahead of time in case you lose your ability to make decisions for yourself. Advance directives can apply to any medical decision, such as the treatments you want, and if you want to donate organs. What are the types of advance directives?   There are many types of advance directives, and each state has rules about how to use them. You may choose a combination of any of the following:  Living will: This is a written record of the treatment you want. You can also choose which treatments you do not want, which to limit, and which to stop at a certain time. This includes surgery, medicine, IV fluid, and tube feedings. Durable power of  for [facility]): This is a written record that states who you want to make healthcare choices for you when you are unable to make them for yourself. This person, called a proxy, is usually a family member or a friend. You may choose more than 1 proxy. Do not resuscitate (DNR) order:  A DNR order is used in case your heart stops beating or you stop breathing. It is a request not to have certain forms of treatment, such as CPR. A DNR order may be included in other types of advance directives. Medical directive: This covers the care that you want if you are in a coma, near death, or unable to make decisions for yourself. You can list the treatments you want for each condition. Treatment may include pain medicine, surgery, blood transfusions, dialysis, IV or tube feedings, and a ventilator (breathing machine). Values history: This document has questions about your views, beliefs, and how you feel and think about life. This information can help others choose the care that you would choose. Why are advance directives important? An advance directive helps you control your care. Although spoken wishes may be used, it is better to have your wishes written down. Spoken wishes can be misunderstood, or not followed. Treatments may be given even if you do not want them. An advance directive may make it easier for your family to make difficult choices about your care.    Weight Management   Why it is important to manage your weight:  Being overweight increases your risk of health conditions such as heart disease, high blood pressure, type 2 diabetes, and certain types of cancer. It can also increase your risk for osteoarthritis, sleep apnea, and other respiratory problems. Aim for a slow, steady weight loss. Even a small amount of weight loss can lower your risk of health problems. How to lose weight safely:  A safe and healthy way to lose weight is to eat fewer calories and get regular exercise. You can lose up about 1 pound a week by decreasing the number of calories you eat by 500 calories each day. Healthy meal plan for weight management:  A healthy meal plan includes a variety of foods, contains fewer calories, and helps you stay healthy. A healthy meal plan includes the following:  Eat whole-grain foods more often. A healthy meal plan should contain fiber. Fiber is the part of grains, fruits, and vegetables that is not broken down by your body. Whole-grain foods are healthy and provide extra fiber in your diet. Some examples of whole-grain foods are whole-wheat breads and pastas, oatmeal, brown rice, and bulgur. Eat a variety of vegetables every day. Include dark, leafy greens such as spinach, kale, veronika greens, and mustard greens. Eat yellow and orange vegetables such as carrots, sweet potatoes, and winter squash. Eat a variety of fruits every day. Choose fresh or canned fruit (canned in its own juice or light syrup) instead of juice. Fruit juice has very little or no fiber. Eat low-fat dairy foods. Drink fat-free (skim) milk or 1% milk. Eat fat-free yogurt and low-fat cottage cheese. Try low-fat cheeses such as mozzarella and other reduced-fat cheeses. Choose meat and other protein foods that are low in fat. Choose beans or other legumes such as split peas or lentils. Choose fish, skinless poultry (chicken or turkey), or lean cuts of red meat (beef or pork). Before you cook meat or poultry, cut off any visible fat. Use less fat and oil. Try baking foods instead of frying them.  Add less fat, such as margarine, sour cream, regular salad dressing and mayonnaise to foods. Eat fewer high-fat foods. Some examples of high-fat foods include french fries, doughnuts, ice cream, and cakes. Eat fewer sweets. Limit foods and drinks that are high in sugar. This includes candy, cookies, regular soda, and sweetened drinks. Exercise:  Exercise at least 30 minutes per day on most days of the week. Some examples of exercise include walking, biking, dancing, and swimming. You can also fit in more physical activity by taking the stairs instead of the elevator or parking farther away from stores. Ask your healthcare provider about the best exercise plan for you. © Copyright [address] 2018 Information is for End User's use only and may not be sold, redistributed or otherwise used for commercial purposes.  All illustrations and images included in CareNotes® are the copyrighted property of [employer] or [address] [place] [address]

## 2023-12-06 NOTE — PROGRESS NOTES
Assessment and Plan:     Problem List Items Addressed This Visit     Essential hypertension    Relevant Orders    CBC and differential    Comprehensive metabolic panel    Lipid panel    Ulcerative colitis without complications ([address])   Other Visit Diagnoses     Encounter for Medicare annual wellness exam    -  Primary    Prostate cancer screening        Relevant Orders    PSA, Total Screen           Preventive health issues were discussed with patient, and age appropriate screening tests were ordered as noted in patient's After Visit Summary. Personalized health advice and appropriate referrals for health education or preventive services given if needed, as noted in patient's After Visit Summary. History of Present Illness:     Patient presents for a Medicare Wellness Visit    HPI   Patient Care Team:  Prince Lala DO as PCP - General (Family Medicine)    Having flex sig on 12/13/23. Notes that his GI symptoms have been up and down. States that he has identified a new trigger of chicken skin. Has been doing his increased does of Mesalamine and this was helpful for awhile but he is back to intermittent symptoms. States that he is doing well with his medications, BP has been well controlled with the 120s/80s and rarely 130s/80s. He is exercising about 5 days per week. He is not losing weight. He notes that he is a big eater, not following recommended serving sizes. Notes that his portions are larger then they should be. Review of Systems:     Review of Systems     Problem List:     Patient Active Problem List   Diagnosis   • Essential hypertension   • Extrinsic asthma without complication   • Ulcerative colitis without complications ([address])   • Hx of adenomatous colonic polyps   • Primary osteoarthritis of both knees   • Medication monitoring encounter   • Obesity (BMI 30.0-34. 9)      Past Medical and Surgical History:     Past Medical History:   Diagnosis Date   • Arthritis    • Asthma    • Colon polyp    • GERD (gastroesophageal reflux disease)     occasional dependent on food choice-today feels well 3/9/23   • Hyperlipidemia    • Hypertension    • Tinnitus    • Ulcerative colitis ([address])    • Vertigo     feels well today 3/9/23     Past Surgical History:   Procedure Laterality Date   • COLONOSCOPY     • ROTATOR CUFF REPAIR Right    • TONSILLECTOMY        Family History:     Family History   Problem Relation Age of Onset   • Crohn's disease Cousin       Social History:     Social History     Socioeconomic History   • Marital status:      Spouse name: None   • Number of children: None   • Years of education: None   • Highest education level: None   Occupational History   • None   Tobacco Use   • Smoking status: Former     Packs/day: 1.50     Years: 7.00     Total pack years: 10.50     Types: Cigarettes     Start date:      Quit date: 10/14/1980     Years since quittin.1   • Smokeless tobacco: Never   Vaping Use   • Vaping Use: Never used   Substance and Sexual Activity   • Alcohol use: Not Currently     Comment: rarely   • Drug use: Never   • Sexual activity: None   Other Topics Concern   • None   Social History Narrative   • None     Social Determinants of Health     Financial Resource Strain: Low Risk  (2023)    Overall Financial Resource Strain (CARDIA)    • Difficulty of Paying Living Expenses: Not hard at all   Food Insecurity: Not on file   Transportation Needs: No Transportation Needs (2023)    PRAPARE - Transportation    • Lack of Transportation (Medical): No    • Lack of Transportation (Non-Medical):  No   Physical Activity: Not on file   Stress: Not on file   Social Connections: Not on file   Intimate Partner Violence: Not on file   Housing Stability: Not on file      Medications and Allergies:     Current Outpatient Medications   Medication Sig Dispense Refill   • albuterol (5 mg/mL) 0.5 % nebulizer solution Take 0.5 mL (2.5 mg total) by nebulization every 6 (six) hours as needed for wheezing 180 mL 2   • albuterol (PROVENTIL HFA,VENTOLIN HFA) 90 mcg/act inhaler Inhale 2 puffs every 6 (six) hours as needed for wheezing 8 g 2   • amLODIPine (NORVASC) 5 mg tablet TAKE 1 TABLET (5 MG TOTAL) BY MOUTH DAILY. 90 tablet 1   • benazepril (LOTENSIN) 20 mg tablet TAKE 1 TABLET BY MOUTH EVERY DAY 90 tablet 1   • Coenzyme Q10 (CO Q-10 PO) Take by mouth     • fluticasone (FLONASE) 50 mcg/act nasal spray SPRAY 1 SPRAY INTO EACH NOSTRIL EVERY DAY 48 mL 0   • FOLIC ACID PO Take by mouth     • hydrochlorothiazide (HYDRODIURIL) 12.5 mg tablet TAKE 1 TABLET BY MOUTH EVERY DAY 90 tablet 1   • mesalamine (ASACOL) 800 MG EC tablet Take 2 tablets (1,600 mg total) by mouth 2 (two) times a day 360 tablet 2   • multivitamin (THERAGRAN) TABS Take 1 tablet by mouth daily     • rosuvastatin (CRESTOR) 10 MG tablet TAKE 1 TABLET BY MOUTH EVERY DAY 90 tablet 1   • UBIQUINOL PO Take by mouth     • hydrocortisone (CORTENEMA) 100 mg/60 mL enema Insert 60 mL (100 mg total) into the rectum daily at bedtime for 21 doses 21 enema 1     No current facility-administered medications for this visit. No Known Allergies   Immunizations:     Immunization History   Administered Date(s) Administered   • INFLUENZA 10/29/2021, 11/01/2022   • Influenza, high dose seasonal 0.7 mL 10/14/2020   • Zoster Vaccine Recombinant 10/20/2020, 12/21/2020      Health Maintenance:         Topic Date Due   • Colorectal Cancer Screening  09/05/2023   • Hepatitis C Screening  Completed         Topic Date Due   • COVID-19 Vaccine (1) Never done   • Pneumococcal Vaccine: 65+ Years (1 - PCV) Never done   • Influenza Vaccine (1) 09/01/2023      Medicare Screening Tests and Risk Assessments:     Berna Hartman is here for his Subsequent Wellness visit. Last Medicare Wellness visit information reviewed, patient interviewed, no change since last AWV. Health Risk Assessment:   Patient rates overall health as good.  Patient feels that their physical health rating is slightly better. Patient is very satisfied with their life. Eyesight was rated as slightly worse. Hearing was rated as same. Patient feels that their emotional and mental health rating is slightly better. Patients states they are sometimes angry. Patient states they are sometimes unusually tired/fatigued. Pain experienced in the last 7 days has been some. Patient's pain rating has been 8/10. Patient states that he has experienced no weight loss or gain in last 6 months. Fall Risk Screening: In the past year, patient has experienced: no history of falling in past year      Home Safety:  Patient has trouble with stairs inside or outside of their home. Patient has working smoke alarms and has working carbon monoxide detector. Home safety hazards include: none. Nutrition:   Current diet is Regular. Medications:   Patient is currently taking over-the-counter supplements. OTC medications include: see medication list. Patient is able to manage medications. Activities of Daily Living (ADLs)/Instrumental Activities of Daily Living (IADLs):   Walk and transfer into and out of bed and chair?: Yes  Dress and groom yourself?: Yes    Bathe or shower yourself?: Yes    Feed yourself?  Yes  Do your laundry/housekeeping?: Yes  Manage your money, pay your bills and track your expenses?: Yes  Make your own meals?: Yes    Do your own shopping?: Yes    Previous Hospitalizations:   Any hospitalizations or ED visits within the last 12 months?: No      Advance Care Planning:   Living will: No    Durable POA for healthcare: No      Cognitive Screening:   Provider or family/friend/caregiver concerned regarding cognition?: No    PREVENTIVE SCREENINGS      Cardiovascular Screening:    General: Screening Not Indicated and History Lipid Disorder      Diabetes Screening:     General: Screening Current      Colorectal Cancer Screening:     General: Screening Current      Prostate Cancer Screening:    General: Screening Current      Osteoporosis Screening:    General: Screening Not Indicated      Abdominal Aortic Aneurysm (AAA) Screening:    Risk factors include: age between 70-77 yo and tobacco use        General: Screening Current      Lung Cancer Screening:     General: Screening Not Indicated      Hepatitis C Screening:    General: Screening Current    Screening, Brief Intervention, and Referral to Treatment (SBIRT)    Screening  Typical number of drinks in a day: 0  Typical number of drinks in a week: 0  Interpretation: Low risk drinking behavior. Single Item Drug Screening:  How often have you used an illegal drug (including marijuana) or a prescription medication for non-medical reasons in the past year? never    Single Item Drug Screen Score: 0  Interpretation: Negative screen for possible drug use disorder    Other Counseling Topics:   Car/seat belt/driving safety, sunscreen and regular weightbearing exercise. No results found. Physical Exam:     /76   Pulse 82   Temp 97.6 °F (36.4 °C)   Ht 5' 10" (1.778 m)   Wt 106 kg (234 lb)   SpO2 96%   BMI 33.58 kg/m²     Physical Exam  Vitals reviewed. Constitutional:       General: He is not in acute distress. Appearance: Normal appearance. He is obese. HENT:      Head: Normocephalic and atraumatic. Right Ear: External ear normal.      Left Ear: External ear normal.      Nose: Nose normal.      Mouth/Throat:      Mouth: Mucous membranes are moist.   Eyes:      Extraocular Movements: Extraocular movements intact. Conjunctiva/sclera: Conjunctivae normal.   Cardiovascular:      Rate and Rhythm: Normal rate and regular rhythm. Heart sounds: Normal heart sounds. Pulmonary:      Effort: Pulmonary effort is normal.      Breath sounds: Normal breath sounds. No wheezing, rhonchi or rales. Abdominal:      General: Bowel sounds are normal. There is no distension. Palpations: Abdomen is soft. Tenderness:  There is no abdominal tenderness. Musculoskeletal:      Right lower leg: No edema. Left lower leg: No edema. Skin:     General: Skin is warm. Capillary Refill: Capillary refill takes less than 2 seconds. Findings: No rash. Neurological:      Mental Status: He is alert. Mental status is at baseline.         Hayley Karimi,

## 2023-12-07 ENCOUNTER — APPOINTMENT (OUTPATIENT)
Dept: LAB | Facility: HOSPITAL | Age: 73
End: 2023-12-07
Attending: FAMILY MEDICINE
Payer: COMMERCIAL

## 2023-12-07 DIAGNOSIS — Z12.5 PROSTATE CANCER SCREENING: ICD-10-CM

## 2023-12-07 DIAGNOSIS — I10 ESSENTIAL HYPERTENSION: ICD-10-CM

## 2023-12-07 LAB
ALBUMIN SERPL BCP-MCNC: 4.2 G/DL (ref 3.5–5)
ALP SERPL-CCNC: 84 U/L (ref 34–104)
ALT SERPL W P-5'-P-CCNC: 29 U/L (ref 7–52)
ANION GAP SERPL CALCULATED.3IONS-SCNC: 5 MMOL/L
AST SERPL W P-5'-P-CCNC: 21 U/L (ref 13–39)
BASOPHILS # BLD AUTO: 0.05 THOUSANDS/ÂΜL (ref 0–0.1)
BASOPHILS NFR BLD AUTO: 1 % (ref 0–1)
BILIRUB SERPL-MCNC: 0.95 MG/DL (ref 0.2–1)
BUN SERPL-MCNC: 18 MG/DL (ref 5–25)
CALCIUM SERPL-MCNC: 9.4 MG/DL (ref 8.4–10.2)
CHLORIDE SERPL-SCNC: 101 MMOL/L (ref 96–108)
CHOLEST SERPL-MCNC: 143 MG/DL
CO2 SERPL-SCNC: 32 MMOL/L (ref 21–32)
CREAT SERPL-MCNC: 0.87 MG/DL (ref 0.6–1.3)
EOSINOPHIL # BLD AUTO: 0.41 THOUSAND/ÂΜL (ref 0–0.61)
EOSINOPHIL NFR BLD AUTO: 4 % (ref 0–6)
ERYTHROCYTE [DISTWIDTH] IN BLOOD BY AUTOMATED COUNT: 13.5 % (ref 11.6–15.1)
GFR SERPL CREATININE-BSD FRML MDRD: 86 ML/MIN/1.73SQ M
GLUCOSE P FAST SERPL-MCNC: 90 MG/DL (ref 65–99)
HCT VFR BLD AUTO: 44.9 % (ref 36.5–49.3)
HDLC SERPL-MCNC: 46 MG/DL
HGB BLD-MCNC: 15.1 G/DL (ref 12–17)
IMM GRANULOCYTES # BLD AUTO: 0.03 THOUSAND/UL (ref 0–0.2)
IMM GRANULOCYTES NFR BLD AUTO: 0 % (ref 0–2)
LDLC SERPL CALC-MCNC: 71 MG/DL (ref 0–100)
LYMPHOCYTES # BLD AUTO: 1.8 THOUSANDS/ÂΜL (ref 0.6–4.47)
LYMPHOCYTES NFR BLD AUTO: 18 % (ref 14–44)
MCH RBC QN AUTO: 29.2 PG (ref 26.8–34.3)
MCHC RBC AUTO-ENTMCNC: 33.6 G/DL (ref 31.4–37.4)
MCV RBC AUTO: 87 FL (ref 82–98)
MONOCYTES # BLD AUTO: 0.8 THOUSAND/ÂΜL (ref 0.17–1.22)
MONOCYTES NFR BLD AUTO: 8 % (ref 4–12)
NEUTROPHILS # BLD AUTO: 6.73 THOUSANDS/ÂΜL (ref 1.85–7.62)
NEUTS SEG NFR BLD AUTO: 69 % (ref 43–75)
NONHDLC SERPL-MCNC: 97 MG/DL
NRBC BLD AUTO-RTO: 0 /100 WBCS
PLATELET # BLD AUTO: 265 THOUSANDS/UL (ref 149–390)
PMV BLD AUTO: 10.2 FL (ref 8.9–12.7)
POTASSIUM SERPL-SCNC: 4.4 MMOL/L (ref 3.5–5.3)
PROT SERPL-MCNC: 7.3 G/DL (ref 6.4–8.4)
PSA SERPL-MCNC: 0.99 NG/ML (ref 0–4)
RBC # BLD AUTO: 5.17 MILLION/UL (ref 3.88–5.62)
SODIUM SERPL-SCNC: 138 MMOL/L (ref 135–147)
TRIGL SERPL-MCNC: 130 MG/DL
WBC # BLD AUTO: 9.82 THOUSAND/UL (ref 4.31–10.16)

## 2023-12-07 PROCEDURE — G0103 PSA SCREENING: HCPCS

## 2023-12-07 PROCEDURE — 36415 COLL VENOUS BLD VENIPUNCTURE: CPT

## 2023-12-07 PROCEDURE — 85025 COMPLETE CBC W/AUTO DIFF WBC: CPT

## 2023-12-07 PROCEDURE — 80061 LIPID PANEL: CPT

## 2023-12-07 PROCEDURE — 80053 COMPREHEN METABOLIC PANEL: CPT

## 2023-12-12 ENCOUNTER — NURSE TRIAGE (OUTPATIENT)
Age: 73
End: 2023-12-12

## 2023-12-12 NOTE — TELEPHONE ENCOUNTER
I spoke with patient and reviewed Sigmoidoscopy prep instructions. Patient will follow as instructed. Reason for Disposition   Information only question and nurse able to answer    Answer Assessment - Initial Assessment Questions  1. REASON FOR CALL or QUESTION: "What is your reason for calling today?" or "How can I best help you?" or "What question do you have that I can help answer?"      Patient called with prep question. Protocols used:  Information Only Call - No Triage-ADULT-[place]

## 2023-12-12 NOTE — TELEPHONE ENCOUNTER
Regarding: sigmoid tomorrow fasting  ----- Message from Joshua Lundy sent at 12/12/2023 10:41 AM EST -----  Pt is calling to ask if he just needs to fast after midnight or should he be fasting all day for his flexible sigmoid tomorrow. I am unable to open the instructions.  Please  call to let pt know if he can eat today of stay fasting

## 2023-12-13 ENCOUNTER — HOSPITAL ENCOUNTER (OUTPATIENT)
Dept: GASTROENTEROLOGY | Facility: AMBULATORY SURGERY CENTER | Age: 73
Discharge: HOME | End: 2023-12-13
Payer: COMMERCIAL

## 2023-12-13 VITALS
HEIGHT: 70 IN | DIASTOLIC BLOOD PRESSURE: 89 MMHG | BODY MASS INDEX: 33.21 KG/M2 | SYSTOLIC BLOOD PRESSURE: 137 MMHG | TEMPERATURE: 97.2 F | RESPIRATION RATE: 18 BRPM | WEIGHT: 232 LBS | HEART RATE: 68 BPM | OXYGEN SATURATION: 96 %

## 2023-12-13 DIAGNOSIS — K63.89 PROCTOSIGMOIDITIS: ICD-10-CM

## 2023-12-13 PROCEDURE — 45331 SIGMOIDOSCOPY AND BIOPSY: CPT | Performed by: INTERNAL MEDICINE

## 2023-12-13 PROCEDURE — 88305 TISSUE EXAM BY PATHOLOGIST: CPT | Performed by: PATHOLOGY

## 2023-12-13 RX ORDER — MESALAMINE 1.2 G/1
3.6 TABLET, DELAYED RELEASE ORAL
Qty: 90 TABLET | Refills: 5 | Status: SHIPPED | OUTPATIENT
Start: 2023-12-13

## 2023-12-13 NOTE — H&P
History and Physical -  [facility]  Noe Molina 73 y.o. male MRN: [id number]                  HPI: Noe Molina is a 73 y.o. year old male who presents for flex sig due to history of proctosigmoiditis.      REVIEW OF SYSTEMS: Per the HPI, and otherwise unremarkable.    Historical Information   Past Medical History:   Diagnosis Date    Arthritis     Asthma     Colon polyp     GERD (gastroesophageal reflux disease)     occasional dependent on food choice-today feels well 3/9/23    Hyperlipidemia     Hypertension     Tinnitus     Ulcerative colitis (HCC)     Vertigo     feels well today 3/9/23     Past Surgical History:   Procedure Laterality Date    COLONOSCOPY      ROTATOR CUFF REPAIR Right 1991    TONSILLECTOMY       Social History   Social History     Substance and Sexual Activity   Alcohol Use Not Currently    Comment: rarely     Social History     Substance and Sexual Activity   Drug Use Never     Social History     Tobacco Use   Smoking Status Former    Current packs/day: 0.00    Average packs/day: 1.5 packs/day for 11.8 years (17.7 ttl pk-yrs)    Types: Cigarettes    Start date:     Quit date: 10/14/1980    Years since quittin.1   Smokeless Tobacco Never     Family History   Problem Relation Age of Onset    Crohn's disease Cousin        Meds/Allergies       Current Outpatient Medications:     albuterol (PROVENTIL HFA,VENTOLIN HFA) 90 mcg/act inhaler    amLODIPine (NORVASC) 5 mg tablet    benazepril (LOTENSIN) 20 mg tablet    Coenzyme Q10 (CO Q-10 PO)    fluticasone (FLONASE) 50 mcg/act nasal spray    FOLIC ACID PO    hydrochlorothiazide (HYDRODIURIL) 12.5 mg tablet    mesalamine (ASACOL) 800 MG EC tablet    multivitamin (THERAGRAN) TABS    rosuvastatin (CRESTOR) 10 MG tablet    UBIQUINOL PO    albuterol (5 mg/mL) 0.5 % nebulizer solution    hydrocortisone (CORTENEMA) 100 mg/60 mL enema    No Known Allergies    Objective     /78   Pulse 75 Comment: nsr  Temp (!)  "97.2 °F (36.2 °C) (Temporal)   Resp 18   Ht 5' 10\" (1.778 m)   Wt 105 kg (232 lb)   SpO2 95%   BMI 33.29 kg/m²       PHYSICAL EXAM    Gen: NAD  Head: NCAT  CV: RRR  CHEST: Clear  ABD: soft, NT/ND  EXT: no edema      ASSESSMENT/PLAN:  This is a 73 y.o. year old male here for flexible sigmoidoscopy, and he is stable and optimized for his procedure.        "

## 2023-12-18 ENCOUNTER — NURSE TRIAGE (OUTPATIENT)
Age: 73
End: 2023-12-18

## 2023-12-18 DIAGNOSIS — K51.30 ULCERATIVE RECTOSIGMOIDITIS WITHOUT COMPLICATION (HCC): Primary | ICD-10-CM

## 2023-12-18 PROCEDURE — 88305 TISSUE EXAM BY PATHOLOGIST: CPT | Performed by: PATHOLOGY

## 2023-12-18 RX ORDER — MESALAMINE 0.38 G/1
CAPSULE, EXTENDED RELEASE ORAL
Qty: 120 CAPSULE | Refills: 5 | Status: SHIPPED | OUTPATIENT
Start: 2023-12-18

## 2023-12-18 NOTE — TELEPHONE ENCOUNTER
Spoke with patient, reviewed provider recommendations. Patient verbalized understanding, no further questions.

## 2023-12-18 NOTE — TELEPHONE ENCOUNTER
"Please advise   Last OV 6/21/23   Flex sig- 12/13/23   Hx: proctosigmoiditis     Pt calling in, reports he was increase in lialda after flex sig on 12/13 and reports he started with increase itching/ pins and needles feeling on his legs and arms. He explains this happened a little when he was on 1.6 mg but worsened with the recent increase.   He denies seeing any rash/ hives, no swelling or redness, no SOB or tongue swelling    Last night he used a cortisone cream which helped him fall asleep.       Reason for Disposition   Caller has NON-URGENT medicine question about med that PCP or specialist prescribed and triager unable to answer question    Answer Assessment - Initial Assessment Questions  1. NAME of MEDICATION: \"What medicine are you calling about?\"      lialda  2. QUESTION: \"What is your question?\" (e.g., medication refill, side effect)      Itching/ pin and needles feeling on legs and arms     3. PRESCRIBING HCP: \"Who prescribed it?\" Reason: if prescribed by specialist, call should be referred to that group.      GI  Dr Banuelos   4. SYMPTOMS: \"Do you have any symptoms?\"      Itching/ pin needles    Protocols used: Medication Question Call-ADULT-[place]    "

## 2023-12-19 NOTE — RESULT ENCOUNTER NOTE
Please inform patient that their biopsies were benign.  Repeat flex sig 6 months repeat colonoscopy 2027

## 2023-12-20 ENCOUNTER — TELEPHONE (OUTPATIENT)
Dept: GASTROENTEROLOGY | Facility: CLINIC | Age: 73
End: 2023-12-20

## 2023-12-20 NOTE — TELEPHONE ENCOUNTER
"Pt returning call for results of Flex sig and biopsy. Pt was advised and verbalized understanding of the following:    \"Please inform patient that their biopsies were benign.  Repeat flex sig 6 months repeat colonoscopy 2027.\"  "

## 2023-12-20 NOTE — TELEPHONE ENCOUNTER
----- Message from Ross Banuelos MD sent at 12/19/2023  9:02 AM EST -----  Please inform patient that their biopsies were benign.  Repeat flex sig 6 months repeat colonoscopy 2027

## 2024-01-02 ENCOUNTER — OFFICE VISIT (OUTPATIENT)
Dept: URGENT CARE | Facility: CLINIC | Age: 74
End: 2024-01-02
Payer: COMMERCIAL

## 2024-01-02 ENCOUNTER — APPOINTMENT (OUTPATIENT)
Dept: RADIOLOGY | Facility: CLINIC | Age: 74
End: 2024-01-02
Payer: COMMERCIAL

## 2024-01-02 VITALS
TEMPERATURE: 98.7 F | OXYGEN SATURATION: 97 % | DIASTOLIC BLOOD PRESSURE: 86 MMHG | RESPIRATION RATE: 18 BRPM | SYSTOLIC BLOOD PRESSURE: 139 MMHG | HEART RATE: 80 BPM

## 2024-01-02 DIAGNOSIS — R05.9 COUGH, UNSPECIFIED TYPE: ICD-10-CM

## 2024-01-02 DIAGNOSIS — J40 BRONCHITIS: Primary | ICD-10-CM

## 2024-01-02 PROCEDURE — 99213 OFFICE O/P EST LOW 20 MIN: CPT | Performed by: PHYSICIAN ASSISTANT

## 2024-01-02 PROCEDURE — 71046 X-RAY EXAM CHEST 2 VIEWS: CPT

## 2024-01-02 RX ORDER — AZITHROMYCIN 250 MG/1
TABLET, FILM COATED ORAL
Qty: 6 TABLET | Refills: 0 | Status: SHIPPED | OUTPATIENT
Start: 2024-01-02 | End: 2024-01-06

## 2024-01-02 NOTE — PROGRESS NOTES
[facility]        NAME: Noe Molina is a 73 y.o. male  : 1950    MRN: [id number]  DATE: 2024  TIME: 4:28 PM    Assessment and Plan   Bronchitis [J40]  1. Bronchitis  azithromycin (ZITHROMAX) 250 mg tablet      2. Cough, unspecified type  XR chest pa & lateral            Patient Instructions   Take Z-Dmitriy as prescribed  On exam overall well-appearing, non toxic afebrile. Congested but lungs CTA and no increased work of breathing  Continue supportive treatment.:Vitamin D3 2000 IU daily  Vitamin C 1000mg twice per day  Multivitamin daily  Some studies suggest that Zinc 12.5-15mg every 2 hours while awake x 5 days may shorten the duration cold symptoms by 1-2 days.   Increase fluids and rest.  Nasal saline spray; Afrin if severe congestion (do not use for more than 3 days)  Over the counter decongestant/cough suppressants  Tylenol/Ibuprofen for pain/fever  Salt water gargles and chloraseptic spray  Throat Coat Tea  Warm compresses over sinuses  Cool mist humidifier or vicks vaporizer  Follow up with PCP if symptoms do not improve or worsen  Follow up with PCP in 3-5 days.  Proceed to  ER if symptoms worsen.    Chief Complaint     Chief Complaint   Patient presents with    Nasal Congestion     Sore throat and chest congestion, post nasal drip, chills x 9 days. Feels fatigued. Sore throat has resolved. Sputum is brown/yellow         History of Present Illness       HPI  This is a 73-year-old male here complaining of cough since .  He notes some chills and fatigue.  He denies ear pain, sore throat, nasal congestion, fever, vomiting, diarrhea, shortness of breath or chest pain.  Patient does note that he has asthma.  He has been using Tylenol Cold, albuterol inhaler, Flonase and decongestant for symptoms.  Review of Systems   Review of Systems   Constitutional:  Positive for chills. Negative for fever.   HENT:  Negative for congestion, ear pain and sore throat.    Respiratory:   Positive for cough. Negative for shortness of breath.    Cardiovascular:  Negative for chest pain.   Gastrointestinal:  Negative for diarrhea and vomiting.         Current Medications       Current Outpatient Medications:     Acetaminophen-guaiFENesin 325-200 MG TABS, Take by mouth, Disp: , Rfl:     albuterol (PROVENTIL HFA,VENTOLIN HFA) 90 mcg/act inhaler, Inhale 2 puffs every 6 (six) hours as needed for wheezing, Disp: 8 g, Rfl: 2    amLODIPine (NORVASC) 5 mg tablet, TAKE 1 TABLET (5 MG TOTAL) BY MOUTH DAILY., Disp: 90 tablet, Rfl: 1    azithromycin (ZITHROMAX) 250 mg tablet, Take 2 tablets today then 1 tablet daily x 4 days, Disp: 6 tablet, Rfl: 0    benazepril (LOTENSIN) 20 mg tablet, TAKE 1 TABLET BY MOUTH EVERY DAY, Disp: 90 tablet, Rfl: 1    Coenzyme Q10 (CO Q-10 PO), Take by mouth, Disp: , Rfl:     fluticasone (FLONASE) 50 mcg/act nasal spray, SPRAY 1 SPRAY INTO EACH NOSTRIL EVERY DAY, Disp: 48 mL, Rfl: 0    FOLIC ACID PO, Take by mouth, Disp: , Rfl:     hydrochlorothiazide (HYDRODIURIL) 12.5 mg tablet, TAKE 1 TABLET BY MOUTH EVERY DAY, Disp: 90 tablet, Rfl: 1    hydrocortisone (CORTENEMA) 100 mg/60 mL enema, Insert 60 mL (100 mg total) into the rectum daily at bedtime for 21 doses, Disp: 21 enema, Rfl: 1    mesalamine (APRISO) 0.375 g 24 hr capsule, Take four tablets once daily, Disp: 120 capsule, Rfl: 5    multivitamin (THERAGRAN) TABS, Take 1 tablet by mouth daily, Disp: , Rfl:     rosuvastatin (CRESTOR) 10 MG tablet, TAKE 1 TABLET BY MOUTH EVERY DAY, Disp: 90 tablet, Rfl: 1    UBIQUINOL PO, Take by mouth, Disp: , Rfl:     albuterol (5 mg/mL) 0.5 % nebulizer solution, Take 0.5 mL (2.5 mg total) by nebulization every 6 (six) hours as needed for wheezing (Patient not taking: Reported on 1/2/2024), Disp: 180 mL, Rfl: 2    mesalamine (LIALDA) 1.2 g EC tablet, Take 3 tablets (3.6 g total) by mouth daily with breakfast (Patient not taking: Reported on 1/2/2024), Disp: 90 tablet, Rfl: 5    Current Allergies      Allergies as of 01/02/2024    (No Known Allergies)            The following portions of the patient's history were reviewed and updated as appropriate: allergies, current medications, past family history, past medical history, past social history, past surgical history and problem list.     Past Medical History:   Diagnosis Date    Arthritis     Asthma     Colon polyp     GERD (gastroesophageal reflux disease)     occasional dependent on food choice-today feels well 3/9/23    Hyperlipidemia     Hypertension     Tinnitus     Ulcerative colitis (HCC)     Vertigo     feels well today 3/9/23       Past Surgical History:   Procedure Laterality Date    COLONOSCOPY      ROTATOR CUFF REPAIR Right 1991    TONSILLECTOMY         Family History   Problem Relation Age of Onset    Crohn's disease Cousin          Medications have been verified.        Objective   /86   Pulse 80   Temp 98.7 °F (37.1 °C)   Resp 18   SpO2 97%        Physical Exam     Physical Exam  Vitals and nursing note reviewed.   Constitutional:       Appearance: Normal appearance.   HENT:      Right Ear: Tympanic membrane, ear canal and external ear normal.      Left Ear: Tympanic membrane, ear canal and external ear normal.      Nose: Nose normal.      Mouth/Throat:      Mouth: Mucous membranes are moist.      Pharynx: No oropharyngeal exudate or posterior oropharyngeal erythema.   Cardiovascular:      Rate and Rhythm: Normal rate and regular rhythm.   Pulmonary:      Effort: Pulmonary effort is normal. No respiratory distress.      Breath sounds: No stridor. No wheezing, rhonchi or rales.   Chest:      Chest wall: No tenderness.   Neurological:      Mental Status: He is alert.

## 2024-01-09 DIAGNOSIS — K51.30 ULCERATIVE RECTOSIGMOIDITIS WITHOUT COMPLICATION (HCC): ICD-10-CM

## 2024-01-09 RX ORDER — MESALAMINE 0.38 G/1
CAPSULE, EXTENDED RELEASE ORAL
Qty: 360 CAPSULE | Refills: 1 | Status: SHIPPED | OUTPATIENT
Start: 2024-01-09

## 2024-01-11 DIAGNOSIS — I10 ESSENTIAL HYPERTENSION: ICD-10-CM

## 2024-01-11 RX ORDER — HYDROCHLOROTHIAZIDE 12.5 MG/1
TABLET ORAL
Qty: 90 TABLET | Refills: 1 | Status: SHIPPED | OUTPATIENT
Start: 2024-01-11

## 2024-01-18 ENCOUNTER — NURSE TRIAGE (OUTPATIENT)
Age: 74
End: 2024-01-18

## 2024-01-18 NOTE — TELEPHONE ENCOUNTER
"Pt. Calling to verify a script he just picked up from pharmacy, pt. Was ordered hydrocortisone (CORTENEMA) 100 mg/60 mL enema  at last OV June 2023, pt. Just picked up script today, pt. Unsure if he should wait until after his next flex sigmoidoscopy or If she should use it before, please advise            Reason for Disposition   Caller has NON-URGENT medicine question about med that PCP or specialist prescribed and triager unable to answer question    Answer Assessment - Initial Assessment Questions  1. NAME of MEDICATION: \"What medicine are you calling about?\"         Pt. Calling to verify a script he just picked up from pharmacy, pt. Was ordered hydrocortisone (CORTENEMA) 100 mg/60 mL enema  at last OV June 2023, pt. Just picked up script today, pt. Unsure if he should wait until after his next flex sigmoidoscopy or If she should use it before, please advise    Protocols used: Medication Question Call-ADULT-[place]    "

## 2024-01-18 NOTE — TELEPHONE ENCOUNTER
Pt returned call and was made aware of recommendations. Verbalized understanding. Flex Sig due 6/2024. Pt will keep on hand as current supply expires 7/2024.

## 2024-02-01 ENCOUNTER — TELEPHONE (OUTPATIENT)
Dept: FAMILY MEDICINE CLINIC | Facility: CLINIC | Age: 74
End: 2024-02-01

## 2024-02-01 ENCOUNTER — OFFICE VISIT (OUTPATIENT)
Dept: FAMILY MEDICINE CLINIC | Facility: CLINIC | Age: 74
End: 2024-02-01
Payer: COMMERCIAL

## 2024-02-01 VITALS
SYSTOLIC BLOOD PRESSURE: 138 MMHG | HEIGHT: 70 IN | OXYGEN SATURATION: 97 % | BODY MASS INDEX: 33.64 KG/M2 | DIASTOLIC BLOOD PRESSURE: 82 MMHG | WEIGHT: 235 LBS | TEMPERATURE: 97.5 F | HEART RATE: 65 BPM

## 2024-02-01 DIAGNOSIS — R05.2 SUBACUTE COUGH: Primary | ICD-10-CM

## 2024-02-01 DIAGNOSIS — K51.90 ULCERATIVE COLITIS WITHOUT COMPLICATIONS, UNSPECIFIED LOCATION (HCC): ICD-10-CM

## 2024-02-01 LAB
SARS-COV-2 AG UPPER RESP QL IA: NEGATIVE
SL AMB POCT RAPID FLU A: NEGATIVE
SL AMB POCT RAPID FLU B: NEGATIVE
VALID CONTROL: NORMAL

## 2024-02-01 PROCEDURE — 87811 SARS-COV-2 COVID19 W/OPTIC: CPT | Performed by: FAMILY MEDICINE

## 2024-02-01 PROCEDURE — 87804 INFLUENZA ASSAY W/OPTIC: CPT | Performed by: FAMILY MEDICINE

## 2024-02-01 PROCEDURE — 99214 OFFICE O/P EST MOD 30 MIN: CPT | Performed by: FAMILY MEDICINE

## 2024-02-01 RX ORDER — PREDNISONE 20 MG/1
40 TABLET ORAL DAILY
Qty: 10 TABLET | Refills: 0 | Status: SHIPPED | OUTPATIENT
Start: 2024-02-01 | End: 2024-02-06

## 2024-02-01 NOTE — TELEPHONE ENCOUNTER
Reached out to pt -  scheduled accordingly. Pt encouraged to wear a face mask.     Pt left a VM - Transcribed VM :   Yes, my name is Noe Avilez. YOB: 1950 and my phone number is [phone]. I had a cold in the on [place] Day and it turned out where they said it might turn into a pneumonia so they gave me antibiotic and I'm still here's the here. It's almost February now and I'm still not feeling right. Chills and the just feel like a cold. All right, that's my telephone number and everything. Bye.

## 2024-02-01 NOTE — PROGRESS NOTES
"Assessment/Plan:    No problem-specific Assessment & Plan notes found for this encounter.     Diagnoses and all orders for this visit:    Subacute cough  -     POCT rapid flu A and B  -     POCT Rapid Covid Ag  -     predniSONE 20 mg tablet; Take 2 tablets (40 mg total) by mouth daily for 5 days    Ulcerative colitis without complications, unspecified location (HCC)  Stable, no bleeding or GI symptoms at this time.       Subjective:      Patient ID: Noe Molina is a 73 y.o. male.    HPI    Started with body aches and cold on 12/25/23. Went to urgent care in [facility], has not returned back to normal. Treated with Z rj from 1/2/24 to 1/7/24. Has been using albuterol 2-3 times per day. States that he has been using this to help him cough things up. Reports that his cough is improved but still present and productive.   Denies SOB.   Denies fever.    Reports that he his feeling aches and cold joints.     The following portions of the patient's history were reviewed and updated as appropriate: allergies, current medications, past family history, past medical history, past social history, past surgical history, and problem list.    Review of Systems      Objective:    /82   Pulse 65   Temp 97.5 °F (36.4 °C)   Ht 5' 10\" (1.778 m)   Wt 107 kg (235 lb)   SpO2 97%   BMI 33.72 kg/m²      Physical Exam  Vitals reviewed.   Constitutional:       General: He is not in acute distress.     Appearance: Normal appearance.   HENT:      Head: Normocephalic and atraumatic.      Right Ear: External ear normal.      Left Ear: External ear normal.      Nose: Nose normal.      Mouth/Throat:      Mouth: Mucous membranes are moist.   Eyes:      Extraocular Movements: Extraocular movements intact.      Conjunctiva/sclera: Conjunctivae normal.   Cardiovascular:      Rate and Rhythm: Normal rate and regular rhythm.      Heart sounds: Normal heart sounds.   Pulmonary:      Breath sounds: Normal breath sounds. No wheezing, " rhonchi or rales.      Comments: Dry cough with deep inhalation.   Abdominal:      General: Bowel sounds are normal. There is no distension.      Palpations: Abdomen is soft. There is no mass.      Tenderness: There is no abdominal tenderness. There is no guarding.   Musculoskeletal:      Right lower leg: No edema.      Left lower leg: No edema.   Skin:     General: Skin is warm.      Capillary Refill: Capillary refill takes less than 2 seconds.   Neurological:      Mental Status: He is alert. Mental status is at baseline.         DO Jarrod Andrewsroe [facility]  2/1/2024 12:52 PM

## 2024-02-08 DIAGNOSIS — I10 ESSENTIAL HYPERTENSION: ICD-10-CM

## 2024-02-08 RX ORDER — AMLODIPINE BESYLATE 5 MG/1
5 TABLET ORAL DAILY
Qty: 90 TABLET | Refills: 1 | Status: SHIPPED | OUTPATIENT
Start: 2024-02-08

## 2024-03-06 ENCOUNTER — NURSE TRIAGE (OUTPATIENT)
Age: 74
End: 2024-03-06

## 2024-03-06 ENCOUNTER — APPOINTMENT (OUTPATIENT)
Dept: LAB | Facility: HOSPITAL | Age: 74
End: 2024-03-06

## 2024-03-06 ENCOUNTER — TELEPHONE (OUTPATIENT)
Dept: GASTROENTEROLOGY | Facility: CLINIC | Age: 74
End: 2024-03-06

## 2024-03-06 ENCOUNTER — OFFICE VISIT (OUTPATIENT)
Dept: GASTROENTEROLOGY | Facility: CLINIC | Age: 74
End: 2024-03-06
Payer: COMMERCIAL

## 2024-03-06 ENCOUNTER — TELEPHONE (OUTPATIENT)
Age: 74
End: 2024-03-06

## 2024-03-06 VITALS
WEIGHT: 235 LBS | HEART RATE: 84 BPM | SYSTOLIC BLOOD PRESSURE: 137 MMHG | DIASTOLIC BLOOD PRESSURE: 85 MMHG | BODY MASS INDEX: 33.64 KG/M2 | HEIGHT: 70 IN

## 2024-03-06 DIAGNOSIS — K63.89 PROCTOSIGMOIDITIS: ICD-10-CM

## 2024-03-06 DIAGNOSIS — K63.89 PROCTOSIGMOIDITIS: Primary | ICD-10-CM

## 2024-03-06 DIAGNOSIS — D12.6 ADENOMATOUS POLYP OF COLON, UNSPECIFIED PART OF COLON: ICD-10-CM

## 2024-03-06 DIAGNOSIS — K51.30 ULCERATIVE RECTOSIGMOIDITIS WITHOUT COMPLICATION (HCC): ICD-10-CM

## 2024-03-06 PROCEDURE — 99213 OFFICE O/P EST LOW 20 MIN: CPT | Performed by: NURSE PRACTITIONER

## 2024-03-06 RX ORDER — VITAMIN B COMPLEX
100 TABLET ORAL DAILY
COMMUNITY

## 2024-03-06 RX ORDER — MESALAMINE 4 G/60ML
4 SUSPENSION RECTAL
Qty: 1800 ML | Refills: 0 | Status: SHIPPED | OUTPATIENT
Start: 2024-03-06 | End: 2024-04-05

## 2024-03-06 NOTE — TELEPHONE ENCOUNTER
"Mesalamine enema PA faxed to [facility],  insurance is covering enemas and PA was not required, pt. Will be able to  medication once it's ready at pharmacy   Reason for Disposition   Information only question and nurse able to answer    Answer Assessment - Initial Assessment Questions  1. REASON FOR CALL or QUESTION: \"What is your reason for calling today?\" or \"How can I best help you?\" or \"What question do you have that I can help answer?\"        Insurance calling and pt.  Mesalamine enemas are covered, no PA required    Protocols used: Information Only Call - No Triage-ADULT-[place]    "

## 2024-03-06 NOTE — TELEPHONE ENCOUNTER
----- Message from Teresa Luna sent at 3/6/2024  1:03 PM EST -----  Regarding: FW: prior auth for rowasa enema  Thank you.  ----- Message -----  From: TYLER Marrero  Sent: 3/6/2024  12:59 PM EST  To: [facility] [address] [facility]  Subject: prior auth for rowasa enema                      Please initiate a prior Auth for Rowasa enemas for the patient.  He reports issues obtaining this in the past.  I just sent a prescription during his office visit today.  Thank you

## 2024-03-06 NOTE — TELEPHONE ENCOUNTER
Scheduled date of sigmoidoscopy (as of today):06/05/24  Physician performing sigmoidoscopy:Dr. Strong  Location:[place]  Instructions reviewed with patient by:rohith  Clearances:  n/a

## 2024-03-06 NOTE — PROGRESS NOTES
[facility] [place] - Outpatient Follow-up Note  Noe Molina 73 y.o. male MRN: [id number]  Encounter: [id number]          ASSESSMENT AND PLAN:      1. Proctosigmoiditis  Patient with history of ulcerative proctosigmoiditis with last colonoscopy in December 2023 showing chronic active colitis in the sigmoid colon. He reports 3-4 bowel movements a day at baseline, but has been having 4-5 bowel movements a day with intermittent rectal bleeding and urgency since January after treated with a course of Azithromycin/steroids for suspected COVID/Pna.  Denies any abdominal pain, nausea/vomiting, fevers/chills or weight loss. He took 1 week of Cortenema about a month ago which seemed to help with rectal bleeding. Will check for infectious etiology, however less likely given inflammation seen on most recent flex sig and improvement with 1 week of cortenema.  Insurance has not covered mesalamine enemas previously.  He was switched from Lialda to Apriso and is taking 4 capsules daily.    -Check stool studies to evaluate for underlying infection  -Continue Apriso 4 capsules daily  -If covered by insurance with start Rowasa enema 4g daily at bedtime for 4 weeks. If no improvement or if not covered will use Cortenema instead if infectious workup benign  -Avoid NSAIDs  -Stay well hydrated, low residue diet  -Repeat flex sig in June, will be due for repeat labs at that time as well.    -     Stool Enteric Bacterial Panel by PCR; Future  -     Clostridium difficile toxin by PCR with EIA; Future    2. Adenomatous polyp of colon, unspecified part of colon  Due for surveillance in 2027      ______________________________________________________________________    SUBJECTIVE:  Noe Molina is a 73 y.o. male presenting for follow-up for proctosigmoiditis.      He has a history of ulcerative colitis with sigmoiditis on colonoscopy in March 2023 treated with steroid enemas and increased mesalamine dosage.   He was seen for follow-up in June and had temporary improvement but then noticed more mucus per rectum and increased frequency with 4-5 bowel movements per day (baseline 3-4), and was recommended to go back on steroid enemas for 2 weeks followed by tapering every other day and continue increased dose of mesalamine.  He had repeat flexible sigmoidoscopy in December with inflammation of the sigmoid from 20 cm to the proximal rectum with minimal rectal inflammation and biopsy from the sigmoid colon showed chronic active colitis with cryptitis and crypt branching.  He was recommended to repeat flexible sigmoidoscopy in 6 months and a full colonoscopy in 2027.      Pt reports following flex sig, Lialda was increased but he was unable to tolerate with burning in his legs. Currently taking 4 Apriso daily. Had COVID/PNA in January treated with Z-pack & steroids. He developed rectal bleeding with this and took  box (1 week) Cortenemas about a month ago which helped the bleeding but didn't stop it. He's currently having 4 bowel movements a day with some blood and urgency. No abdominal pain. Denies any nocturnal symptoms, nausea/vomiting, fevers/chills, weight loss.        REVIEW OF SYSTEMS IS OTHERWISE NEGATIVE.      Historical Information   Past Medical History:   Diagnosis Date    Arthritis     Asthma     Colon polyp     GERD (gastroesophageal reflux disease)     occasional dependent on food choice-today feels well 3/9/23    Hyperlipidemia     Hypertension     Tinnitus     Ulcerative colitis (HCC)     Vertigo     feels well today 3/9/23     Past Surgical History:   Procedure Laterality Date    COLONOSCOPY      ROTATOR CUFF REPAIR Right 1991    TONSILLECTOMY      UPPER GASTROINTESTINAL ENDOSCOPY       Social History   Social History     Substance and Sexual Activity   Alcohol Use Yes    Comment: rarely     Social History     Substance and Sexual Activity   Drug Use Never     Social History     Tobacco Use   Smoking Status  "Former    Current packs/day: 0.00    Average packs/day: 1.5 packs/day for 11.8 years (17.7 ttl pk-yrs)    Types: Cigarettes    Start date:     Quit date: 10/14/1980    Years since quittin.4   Smokeless Tobacco Never     Family History   Problem Relation Age of Onset    Crohn's disease Cousin        Meds/Allergies       Current Outpatient Medications:     albuterol (PROVENTIL HFA,VENTOLIN HFA) 90 mcg/act inhaler    amLODIPine (NORVASC) 5 mg tablet    benazepril (LOTENSIN) 20 mg tablet    Coenzyme Q10 (CoQ10) 100 MG CAPS    fluticasone (FLONASE) 50 mcg/act nasal spray    FOLIC ACID PO    hydrochlorothiazide (HYDRODIURIL) 12.5 mg tablet    hydrocortisone (CORTENEMA) 100 mg/60 mL enema    mesalamine (APRISO) 0.375 g 24 hr capsule    multivitamin (THERAGRAN) TABS    rosuvastatin (CRESTOR) 10 MG tablet    Acetaminophen-guaiFENesin 325-200 MG TABS    albuterol (5 mg/mL) 0.5 % nebulizer solution    Coenzyme Q10 (CO Q-10 PO)    UBIQUINOL PO    Allergies   Allergen Reactions    Mesalamine Er Dermatitis           Objective     Blood pressure 137/85, pulse 84, height 5' 10\" (1.778 m), weight 107 kg (235 lb). Body mass index is 33.72 kg/m².      PHYSICAL EXAM:      General Appearance:   Alert, cooperative, no distress   HEENT:   Normocephalic, atraumatic, anicteric.     Neck:  Supple, symmetrical, trachea midline   Lungs:   Clear to auscultation bilaterally; no rales, rhonchi or wheezing; respirations unlabored    Heart::   Regular rate and rhythm; no murmur.   Abdomen:   Soft, non-tender, non-distended; normal bowel sounds; no masses, no organomegaly    Genitalia:   Deferred    Rectal:   Deferred    Extremities:  No cyanosis, clubbing or edema    Skin:  No jaundice, rashes, or lesions    Lymph nodes:  No palpable cervical lymphadenopathy        Lab Results:   No visits with results within 1 Day(s) from this visit.   Latest known visit with results is:   Office Visit on 2024   Component Date Value    RAPID FLU A " 02/01/2024 Negative     RAPID FLU B 02/01/2024 Negative     POCT SARS-CoV-2 Ag 02/01/2024 Negative     VALID CONTROL 02/01/2024 Valid          Radiology Results:   No results found.

## 2024-03-06 NOTE — TELEPHONE ENCOUNTER
PA for mesalamine rowasa enemas    Submitted via    []CMM-KEY   [][employer]Case ID #   [x]Faxed to [facility] [place]   []Other website   []Phone call Case ID #     Office notes sent, clinical questions answered. Awaiting determination    Turnaround time for your insurance to make a decision on your Prior Authorization can take 7-21 business days.

## 2024-03-06 NOTE — PATIENT INSTRUCTIONS
Ulcerative Colitis   WHAT YOU NEED TO KNOW:   Ulcerative colitis is a chronic disease of the colon (large intestine). Inflammation and ulcers form on the inner lining of your colon. Ulcerative colitis is a type of inflammatory bowel disease. You may have times when signs and symptoms will decrease or disappear (remission). You will need to continue treatment in times of remission.       DISCHARGE INSTRUCTIONS:   Call, or have someone call, your local emergency number (911 in the [place]) if:   You have sudden trouble breathing.    You have a fast heart rate, fast breathing, or are too dizzy to stand up.    Return to the emergency department if:   You have severe abdominal pain.    Your vomit has blood in it or looks like coffee grounds.    You see bright red blood in your bowel movement.    Call your doctor if:   Your symptoms return.     You have questions or concerns about your condition or care.    Medicines:   Medicines  may be given to help decrease inflammation or control your immune system.    Take your medicine as directed.  Contact your healthcare provider if you think your medicine is not helping or if you have side effects. Tell your provider if you are allergic to any medicine. Keep a list of the medicines, vitamins, and herbs you take. Include the amounts, and when and why you take them. Bring the list or the pill bottles to follow-up visits. Carry your medicine list with you in case of an emergency.    Self-care:   Do not take NSAID medicines , including aspirin and ibuprofen. NSAIDs can cause flare-ups.    Eat a variety of healthy foods  to keep your colon healthy. Healthy foods include fruit, vegetables, whole-grain breads, low-fat dairy products, beans, lean meat, and fish. Do not eat foods that make your symptoms worse. Your healthcare provider may give you vitamins or minerals to improve your nutrition if you have severe ulcerative colitis.     Drink liquids as directed.  Ask how much liquid to drink  each day and which liquids are best for you. For most people, water, juice, and milk are good choices. Do not drink alcohol. This can make your symptoms worse.    Exercise regularly.  Ask about the best exercise plan for you. Any activity is better than none. Even 10 minutes a few times a day would help prevent constipation and help keep your colon healthy.         Manage stress.  Stress may slow healing and cause illness. Learn new ways to relax, such as deep breathing.    Follow up with your doctor as directed:  Keep a written record of your bowel movements. Include the color, form, and if they were bloody. Bring the record to your follow-up visits. Write down your questions so you remember to ask them during your visits.  © Copyright [employer] 2023 Information is for End User's use only and may not be sold, redistributed or otherwise used for commercial purposes.  The above information is an  only. It is not intended as medical advice for individual conditions or treatments. Talk to your doctor, nurse or pharmacist before following any medical regimen to see if it is safe and effective for you.

## 2024-03-07 ENCOUNTER — APPOINTMENT (OUTPATIENT)
Dept: LAB | Facility: HOSPITAL | Age: 74
End: 2024-03-07
Payer: COMMERCIAL

## 2024-03-07 ENCOUNTER — NURSE TRIAGE (OUTPATIENT)
Age: 74
End: 2024-03-07

## 2024-03-07 PROCEDURE — 87493 C DIFF AMPLIFIED PROBE: CPT

## 2024-03-07 PROCEDURE — 87505 NFCT AGENT DETECTION GI: CPT

## 2024-03-07 NOTE — TELEPHONE ENCOUNTER
Reason for Disposition   Information only question and nurse able to answer    Answer Assessment - Initial Assessment Questions  1. REASON FOR CALL or QUESTION: Patient called in today for verification on when to start Mesalamine suppositories.  Per Fernanda's note patient is start suppositories every night for 4 weeks.  He picked them up last night and will start tonight.    Protocols used: Information Only Call - No Triage-ADULT-[place]

## 2024-03-08 LAB
C COLI+JEJUNI TUF STL QL NAA+PROBE: NEGATIVE
C DIFF TOX GENS STL QL NAA+PROBE: NEGATIVE
EC STX1+STX2 GENES STL QL NAA+PROBE: NEGATIVE
SALMONELLA SP SPAO STL QL NAA+PROBE: NEGATIVE
SHIGELLA SP+EIEC IPAH STL QL NAA+PROBE: NEGATIVE

## 2024-03-08 NOTE — TELEPHONE ENCOUNTER
Called pt to advise Medication mesalamine enema does not need prior authorization, patient advised he picked up the medication and he started using it last night.     [x]Spoke with pt. Verbal understanding  []LMOM   []L/M to call office back. Communication consent not updated in chart  []Other

## 2024-03-26 DIAGNOSIS — K63.89 PROCTOSIGMOIDITIS: ICD-10-CM

## 2024-03-26 DIAGNOSIS — K51.30 ULCERATIVE RECTOSIGMOIDITIS WITHOUT COMPLICATION (HCC): ICD-10-CM

## 2024-03-26 RX ORDER — MESALAMINE 4 G/60ML
4 SUSPENSION RECTAL
Qty: 5040 ML | Refills: 1 | Status: SHIPPED | OUTPATIENT
Start: 2024-03-26 | End: 2024-09-10

## 2024-04-26 ENCOUNTER — TELEPHONE (OUTPATIENT)
Dept: GASTROENTEROLOGY | Facility: CLINIC | Age: 74
End: 2024-04-26

## 2024-04-26 DIAGNOSIS — R19.7 DIARRHEA, UNSPECIFIED TYPE: Primary | ICD-10-CM

## 2024-05-05 DIAGNOSIS — I10 ESSENTIAL HYPERTENSION: ICD-10-CM

## 2024-05-05 RX ORDER — BENAZEPRIL HYDROCHLORIDE 20 MG/1
TABLET ORAL
Qty: 90 TABLET | Refills: 1 | Status: SHIPPED | OUTPATIENT
Start: 2024-05-05

## 2024-05-21 DIAGNOSIS — E78.2 MIXED HYPERLIPIDEMIA: ICD-10-CM

## 2024-05-21 RX ORDER — ROSUVASTATIN CALCIUM 10 MG/1
TABLET, COATED ORAL
Qty: 90 TABLET | Refills: 1 | Status: SHIPPED | OUTPATIENT
Start: 2024-05-21

## 2024-06-05 ENCOUNTER — HOSPITAL ENCOUNTER (OUTPATIENT)
Dept: GASTROENTEROLOGY | Facility: HOSPITAL | Age: 74
Setting detail: OUTPATIENT SURGERY
Discharge: HOME | End: 2024-06-05
Admitting: INTERNAL MEDICINE
Payer: COMMERCIAL

## 2024-06-05 VITALS
DIASTOLIC BLOOD PRESSURE: 75 MMHG | HEART RATE: 87 BPM | OXYGEN SATURATION: 94 % | TEMPERATURE: 98 F | SYSTOLIC BLOOD PRESSURE: 135 MMHG | WEIGHT: 242.1 LBS | RESPIRATION RATE: 16 BRPM | HEIGHT: 71 IN | BODY MASS INDEX: 33.89 KG/M2

## 2024-06-05 DIAGNOSIS — K63.89 PROCTOSIGMOIDITIS: ICD-10-CM

## 2024-06-05 PROCEDURE — 45380 COLONOSCOPY AND BIOPSY: CPT | Performed by: INTERNAL MEDICINE

## 2024-06-05 PROCEDURE — 88305 TISSUE EXAM BY PATHOLOGIST: CPT | Performed by: PATHOLOGY

## 2024-06-05 NOTE — H&P
History and Physical -  [facility]  Noe Molina 74 y.o. male MRN: [id number]                  HPI: Noe Molina is a 74 y.o. year old male who presents for ulcerative colitis      REVIEW OF SYSTEMS: Per the HPI, and otherwise unremarkable.    Historical Information   Past Medical History:   Diagnosis Date    Arthritis     Asthma     Colon polyp     GERD (gastroesophageal reflux disease)     occasional dependent on food choice-today feels well 3/9/23    Hyperlipidemia     Hypertension     Tinnitus     Ulcerative colitis (HCC)     Vertigo     feels well today 3/9/23     Past Surgical History:   Procedure Laterality Date    COLONOSCOPY      ROTATOR CUFF REPAIR Right 1991    TONSILLECTOMY      UPPER GASTROINTESTINAL ENDOSCOPY       Social History   Social History     Substance and Sexual Activity   Alcohol Use Yes    Comment: rarely     Social History     Substance and Sexual Activity   Drug Use Never     Social History     Tobacco Use   Smoking Status Former    Current packs/day: 0.00    Average packs/day: 1.5 packs/day for 11.8 years (17.7 ttl pk-yrs)    Types: Cigarettes    Start date:     Quit date: 10/14/1980    Years since quittin.6   Smokeless Tobacco Never     Family History   Problem Relation Age of Onset    Crohn's disease Cousin        Meds/Allergies       Current Outpatient Medications:     albuterol (PROVENTIL HFA,VENTOLIN HFA) 90 mcg/act inhaler    amLODIPine (NORVASC) 5 mg tablet    benazepril (LOTENSIN) 20 mg tablet    Coenzyme Q10 (CoQ10) 100 MG CAPS    fluticasone (FLONASE) 50 mcg/act nasal spray    FOLIC ACID PO    hydrochlorothiazide (HYDRODIURIL) 12.5 mg tablet    mesalamine (APRISO) 0.375 g 24 hr capsule    mesalamine (ROWASA) 4 g    multivitamin (THERAGRAN) TABS    rosuvastatin (CRESTOR) 10 MG tablet    Acetaminophen-guaiFENesin 325-200 MG TABS    albuterol (5 mg/mL) 0.5 % nebulizer solution    hydrocortisone (CORTENEMA) 100 mg/60 mL enema    Allergies  "  Allergen Reactions    Mesalamine Er Dermatitis       Objective     BP (!) 177/98   Pulse 70   Temp 97.9 °F (36.6 °C) (Temporal)   Resp 14   Ht 5' 10.5\" (1.791 m)   Wt 110 kg (242 lb 1.6 oz)   SpO2 95%   BMI 34.25 kg/m²       PHYSICAL EXAM    Gen: NAD  Head: NCAT  CV: RRR  CHEST: Clear  ABD: soft, NT/ND  EXT: no edema      ASSESSMENT/PLAN:  This is a 74 y.o. year old male here for flexible sigmoidoscopy, and he is stable and optimized for his procedure.        "

## 2024-06-10 PROCEDURE — 88305 TISSUE EXAM BY PATHOLOGIST: CPT | Performed by: PATHOLOGY

## 2024-06-17 ENCOUNTER — TELEPHONE (OUTPATIENT)
Age: 74
End: 2024-06-17

## 2024-06-17 NOTE — TELEPHONE ENCOUNTER
He follows with [facility]. If he is looking to transfer care because his provider is retiring, he could see Dr. Mulligan or Dr. Bynum.     He should discuss biopsy results with GI, however, the biopsies appear to be benign with no active colitis.      Rocio Hummel DO  [facility]  6/17/2024 11:41 AM

## 2024-06-17 NOTE — TELEPHONE ENCOUNTER
Patient calling for results of biopsy .    Also would like a referral to [facility] provider as his current provider is retiring.

## 2024-06-18 NOTE — TELEPHONE ENCOUNTER
Called and spoke with patient.He is requesting his biopsy results and recommendations.    He also wants you to know he wants to transfer to a GI  Dr in the [place] area near his home. Would you recommend that he see Dr Mulligan or Dr Bynum?

## 2024-07-05 DIAGNOSIS — I10 ESSENTIAL HYPERTENSION: ICD-10-CM

## 2024-07-05 DIAGNOSIS — K51.30 ULCERATIVE RECTOSIGMOIDITIS WITHOUT COMPLICATION (HCC): ICD-10-CM

## 2024-07-05 RX ORDER — MESALAMINE 0.38 G/1
CAPSULE, EXTENDED RELEASE ORAL
Qty: 360 CAPSULE | Refills: 1 | Status: SHIPPED | OUTPATIENT
Start: 2024-07-05

## 2024-07-05 RX ORDER — HYDROCHLOROTHIAZIDE 12.5 MG/1
TABLET ORAL
Qty: 100 TABLET | Refills: 1 | Status: SHIPPED | OUTPATIENT
Start: 2024-07-05

## 2024-08-05 DIAGNOSIS — I10 ESSENTIAL HYPERTENSION: ICD-10-CM

## 2024-08-05 RX ORDER — AMLODIPINE BESYLATE 5 MG/1
5 TABLET ORAL DAILY
Qty: 100 TABLET | Refills: 1 | Status: SHIPPED | OUTPATIENT
Start: 2024-08-05

## 2024-08-27 DIAGNOSIS — J45.20 MILD INTERMITTENT EXTRINSIC ASTHMA WITHOUT COMPLICATION: ICD-10-CM

## 2024-08-27 DIAGNOSIS — J30.2 SEASONAL ALLERGIES: ICD-10-CM

## 2024-08-27 RX ORDER — ALBUTEROL SULFATE 90 UG/1
AEROSOL, METERED RESPIRATORY (INHALATION)
Qty: 8.5 G | Refills: 1 | Status: SHIPPED | OUTPATIENT
Start: 2024-08-27

## 2024-08-29 ENCOUNTER — TELEPHONE (OUTPATIENT)
Age: 74
End: 2024-08-29

## 2024-10-07 DIAGNOSIS — I10 ESSENTIAL HYPERTENSION: ICD-10-CM

## 2024-10-08 RX ORDER — HYDROCHLOROTHIAZIDE 12.5 MG/1
TABLET ORAL
Qty: 30 TABLET | Refills: 0 | Status: SHIPPED | OUTPATIENT
Start: 2024-10-08

## 2024-10-22 ENCOUNTER — OFFICE VISIT (OUTPATIENT)
Dept: GASTROENTEROLOGY | Facility: CLINIC | Age: 74
End: 2024-10-22
Payer: COMMERCIAL

## 2024-10-22 VITALS
BODY MASS INDEX: 36.13 KG/M2 | HEART RATE: 78 BPM | HEIGHT: 70 IN | RESPIRATION RATE: 18 BRPM | DIASTOLIC BLOOD PRESSURE: 82 MMHG | TEMPERATURE: 97.5 F | WEIGHT: 252.4 LBS | SYSTOLIC BLOOD PRESSURE: 124 MMHG | OXYGEN SATURATION: 95 %

## 2024-10-22 DIAGNOSIS — K51.30 ULCERATIVE RECTOSIGMOIDITIS WITHOUT COMPLICATION (HCC): Primary | ICD-10-CM

## 2024-10-22 PROCEDURE — 99213 OFFICE O/P EST LOW 20 MIN: CPT | Performed by: PHYSICIAN ASSISTANT

## 2024-10-22 NOTE — PROGRESS NOTES
"[facility] - Outpatient Follow-up Note  Noe Molina 74 y.o. male MRN: [id number]  Encounter: [id number]          ASSESSMENT AND PLAN:      1. Ulcerative rectosigmoiditis without complication (HCC)  - Diagnosed in 2017 in [place], mainly maintained on oral mesalamine with good control of symptoms and flare ups 1x/year at most respond quickly to addition of mesalamine enemas  - Flex sig in June showed mild sigmoid erythema as well as diverticulosis, path showed mild chronic inflammation in the sigmoid colon  - Full colonoscopy last done in March 2023 with diverticulosis in the hepatic flexure, descending, and sigmoid colon with abnormal mucosa in the sigmoid colon  - Recall colonoscopy recommended in 2027  - Continue Apriso 1.5 g daily; discussed that this can be increased in the future if needed if not having good control of symptoms  - Discussed rowasa enema course typically should be 6-8 weeks to most effectively treat any ongoing inflammation so if he has a flare up in the next 1-2 months would recommend a longer course than just a few days  - Follow up annually if doing well, otherwise call if any symptoms or problems      ______________________________________________________________________    SUBJECTIVE:  Noe is a 75 yo M with a PMH of ulcerative rectosigmoiditis diagnosed in 2017 in [place], presenting here for routine follow up and changing providers due to location and convenience. He saw Dr. Banuelos for years as well as Dr. Strong. He had a flex sig in June after he was having mucous, diarrhea, and blood in his stool in January after receiving treatment for COVID/pneumonia which improved quickly with cortenemas. The flex sig in June showed mild erythema in the sigmoid colon.  Most recently he had another \"flare up\" of increased mucous with his stools but no blood or diarrhea, and he took 5 days of rowasa enemas which quickly resolved his symptoms so he currently feels great. " "      REVIEW OF SYSTEMS IS OTHERWISE NEGATIVE.      Historical Information   Past Medical History:   Diagnosis Date    Arthritis     Asthma     Colon polyp     GERD (gastroesophageal reflux disease)     occasional dependent on food choice-today feels well 3/9/23    Hyperlipidemia     Hypertension     Tinnitus     Ulcerative colitis (HCC)     Vertigo     feels well today 3/9/23     Past Surgical History:   Procedure Laterality Date    COLONOSCOPY      ROTATOR CUFF REPAIR Right 1991    TONSILLECTOMY      UPPER GASTROINTESTINAL ENDOSCOPY       Social History   Social History     Substance and Sexual Activity   Alcohol Use Yes    Comment: rarely     Social History     Substance and Sexual Activity   Drug Use Yes     Social History     Tobacco Use   Smoking Status Former    Current packs/day: 0.00    Average packs/day: 1.5 packs/day for 11.8 years (17.7 ttl pk-yrs)    Types: Cigarettes    Start date:     Quit date: 10/14/1980    Years since quittin.0   Smokeless Tobacco Never     Family History   Problem Relation Age of Onset    Crohn's disease Cousin        Meds/Allergies       Current Outpatient Medications:     albuterol (PROVENTIL HFA,VENTOLIN HFA) 90 mcg/act inhaler    amLODIPine (NORVASC) 5 mg tablet    benazepril (LOTENSIN) 20 mg tablet    Coenzyme Q10 (CoQ10) 100 MG CAPS    fluticasone (FLONASE) 50 mcg/act nasal spray    FOLIC ACID PO    hydroCHLOROthiazide 12.5 mg tablet    mesalamine (APRISO) 0.375 g 24 hr capsule    multivitamin (THERAGRAN) TABS    rosuvastatin (CRESTOR) 10 MG tablet    Acetaminophen-guaiFENesin 325-200 MG TABS    albuterol (5 mg/mL) 0.5 % nebulizer solution    hydrocortisone (CORTENEMA) 100 mg/60 mL enema    mesalamine (ROWASA) 4 g    Allergies   Allergen Reactions    Mesalamine Er Dermatitis           Objective     Blood pressure 124/82, pulse 78, temperature 97.5 °F (36.4 °C), temperature source Tympanic, resp. rate 18, height 5' 10\" (1.778 m), weight 114 kg (252 lb 6.4 oz), SpO2 " 95%. Body mass index is 36.22 kg/m².      PHYSICAL EXAM:      General Appearance:   Alert, cooperative, no distress   HEENT:   Normocephalic, atraumatic, anicteric.     Neck:  Supple, symmetrical, trachea midline   Lungs:   Clear to auscultation bilaterally; no rales, rhonchi or wheezing; respirations unlabored    Heart::   Regular rate and rhythm; no murmur, rub, or gallop.   Abdomen:   Soft, non-tender, non-distended; normal bowel sounds; no masses, no organomegaly    Genitalia:   Deferred    Rectal:   Deferred    Extremities:  No cyanosis, clubbing or edema    Pulses:  2+ and symmetric    Skin:  No jaundice, rashes, or lesions    Lymph nodes:  No palpable cervical lymphadenopathy        Lab Results:   No visits with results within 1 Day(s) from this visit.   Latest known visit with results is:   Hospital Outpatient Visit on 06/05/2024   Component Date Value    Case Report 06/05/2024                      Value:Surgical Pathology Report                         Case: [id number]                                  Authorizing Provider:  Germain Strong MD         Collected:           06/05/2024 1343              Ordering Location:     [facility]   Received:            06/05/2024 1703                                     Endoscopy                                                                    Pathologist:           Braeden Goins MD                                                           Specimens:   A) - Large Intestine, Sigmoid Colon, cold bx, sigmoid @25cm, evaluate colitis                       B) - Large Intestine, Sigmoid Colon, cold bx, sigmoid @20cm, evaluate colitis              Final Diagnosis 06/05/2024                      Value:A. Colon, “Sigmoid biopsy at 25 cm, evaluate colitis,” Biopsy:  - Benign colonic mucosa with chronic injury type change, compatible with diverticular disease   - Negative for lymphocytic, collagenous, or active colitis  - Negative for acute colitis  - Negative for  "granulomas, dysplasia, or carcinoma    B. Colon, “Sigmoid biopsy at 20 cm, evaluate colitis,” Biopsy:  - Benign colonic mucosa with chronic injury type change, compatible with diverticular disease   - Negative for lymphocytic, collagenous, or active colitis  - Negative for acute colitis  - Negative for granulomas, dysplasia, or carcinoma      Additional Information 06/05/2024                      Value:All reported additional testing was performed with appropriately reactive controls.  These tests were developed and their performance characteristics determined by [facility] or appropriate performing facility, though some tests may be performed on tissues which have not been validated for performance characteristics (such as staining performed on alcohol exposed cell blocks and decalcified tissues).  Results should be interpreted with caution and in the context of the patients’ clinical condition. These tests may not be cleared or approved by the U.S. Food and Drug Administration, though the FDA has determined that such clearance or approval is not necessary. These tests are used for clinical purposes and they should not be regarded as investigational or for research. This laboratory has been approved by [place] 88, designated as a high-complexity laboratory and is qualified to perform these tests.  .Interpretation performed at [facility], [facility] [address]        Gross Description 06/05/2024                      Value:A. The specimen is received in formalin, labeled with the patient's name and hospital number, and is designated \" sigmoid biopsy at 25 cm, evaluate colitis\".  The specimen consists of a single tan-brown soft tissue fragment measuring 0.4 cm.  Entirely submitted. Screened cassette.  B. The specimen is received in formalin, labeled with the patient's name and hospital number, and is designated \" sigmoid biopsy at 20 cm, evaluate colitis\".  " The specimen consists of 2 tan-brown soft tissue fragments each measuring 0.3 cm.  Entirely submitted. Screened cassette.    Note: The estimated total formalin fixation time based upon information provided by the submitting clinician and the standard processing schedule is under 72 hours.    [facility]      Clinical Information 06/05/2024                      Value:FINDINGS:  · Mild, patchy erythematous mucosa measuring 50 mm in the sigmoid colon 2 cm from the anal verge. Mild patchy erythema from approximately 20 to 25 cm and an area of diverticulosis.  Biopsies obtained  · Diverticula of moderate severity in the sigmoid colon           Radiology Results:   No results found.

## 2024-10-30 DIAGNOSIS — I10 ESSENTIAL HYPERTENSION: ICD-10-CM

## 2024-10-30 RX ORDER — BENAZEPRIL HYDROCHLORIDE 20 MG/1
TABLET ORAL
Qty: 90 TABLET | Refills: 0 | Status: SHIPPED | OUTPATIENT
Start: 2024-10-30

## 2024-11-13 DIAGNOSIS — E78.2 MIXED HYPERLIPIDEMIA: ICD-10-CM

## 2024-11-13 RX ORDER — ROSUVASTATIN CALCIUM 10 MG/1
10 TABLET, COATED ORAL DAILY
Qty: 90 TABLET | Refills: 1 | Status: SHIPPED | OUTPATIENT
Start: 2024-11-13

## 2024-12-10 ENCOUNTER — OFFICE VISIT (OUTPATIENT)
Dept: FAMILY MEDICINE CLINIC | Facility: CLINIC | Age: 74
End: 2024-12-10
Payer: COMMERCIAL

## 2024-12-10 VITALS
OXYGEN SATURATION: 95 % | SYSTOLIC BLOOD PRESSURE: 124 MMHG | HEART RATE: 65 BPM | TEMPERATURE: 98.4 F | BODY MASS INDEX: 36.08 KG/M2 | DIASTOLIC BLOOD PRESSURE: 80 MMHG | HEIGHT: 70 IN | WEIGHT: 252 LBS

## 2024-12-10 DIAGNOSIS — M17.0 PRIMARY OSTEOARTHRITIS OF BOTH KNEES: ICD-10-CM

## 2024-12-10 DIAGNOSIS — Z12.5 PROSTATE CANCER SCREENING: ICD-10-CM

## 2024-12-10 DIAGNOSIS — E66.01 OBESITY, MORBID (HCC): ICD-10-CM

## 2024-12-10 DIAGNOSIS — K51.30 ULCERATIVE RECTOSIGMOIDITIS WITHOUT COMPLICATION (HCC): ICD-10-CM

## 2024-12-10 DIAGNOSIS — Z00.00 ENCOUNTER FOR MEDICARE ANNUAL WELLNESS EXAM: Primary | ICD-10-CM

## 2024-12-10 DIAGNOSIS — I10 ESSENTIAL HYPERTENSION: ICD-10-CM

## 2024-12-10 PROCEDURE — 99213 OFFICE O/P EST LOW 20 MIN: CPT | Performed by: FAMILY MEDICINE

## 2024-12-10 PROCEDURE — G0439 PPPS, SUBSEQ VISIT: HCPCS | Performed by: FAMILY MEDICINE

## 2024-12-10 NOTE — ASSESSMENT & PLAN NOTE
Controlled with HCTZ 12.5 mg daily,  Amlodipine 5 mg daily and Benazepril 20 mg daily. Continue with atorvastatin 20 mg.   Orders:  •  CBC and differential; Future  •  Comprehensive metabolic panel; Future  •  Lipid panel; Future

## 2024-12-10 NOTE — PATIENT INSTRUCTIONS
Medicare Preventive Visit Patient Instructions  Thank you for completing your Welcome to Medicare Visit or Medicare Annual Wellness Visit today. Your next wellness visit will be due in one year (12/11/2025).  The screening/preventive services that you may require over the next 5-10 years are detailed below. Some tests may not apply to you based off risk factors and/or age. Screening tests ordered at today's visit but not completed yet may show as past due. Also, please note that scanned in results may not display below.  Preventive Screenings:  Service Recommendations Previous Testing/Comments   Colorectal Cancer Screening  Colonoscopy    Fecal Occult Blood Test (FOBT)/Fecal Immunochemical Test (FIT)  Fecal DNA/Cologuard Test  Flexible Sigmoidoscopy Age: 45-75 years old   Colonoscopy: every 10 years (May be performed more frequently if at higher risk)  OR  FOBT/FIT: every 1 year  OR  Cologuard: every 3 years  OR  Sigmoidoscopy: every 5 years  Screening may be recommended earlier than age 45 if at higher risk for colorectal cancer. Also, an individualized decision between you and your healthcare provider will decide whether screening between the ages of 76-85 would be appropriate. Colonoscopy: 03/09/2023  FOBT/FIT: Not on file  Cologuard: Not on file  Sigmoidoscopy: 06/05/2024    Screening Current     Prostate Cancer Screening Individualized decision between patient and health care provider in men between ages of 55-69   Medicare will cover every 12 months beginning on the day after your 50th birthday PSA: 0.99 ng/mL           Hepatitis C Screening Once for adults born between 1945 and 1965  More frequently in patients at high risk for Hepatitis C Hep C Antibody: 06/14/2022    Screening Current   Diabetes Screening 1-2 times per year if you're at risk for diabetes or have pre-diabetes Fasting glucose: 90 mg/dL (12/7/2023)  A1C: No results in last 5 years (No results in last 5 years)      Cholesterol Screening Once  every 5 years if you don't have a lipid disorder. May order more often based on risk factors. Lipid panel: 12/07/2023  Screening Not Indicated  History Lipid Disorder      Other Preventive Screenings Covered by Medicare:  Abdominal Aortic Aneurysm (AAA) Screening: covered once if your at risk. You're considered to be at risk if you have a family history of AAA or a male between the age of 65-75 who smoking at least 100 cigarettes in your lifetime.  Lung Cancer Screening: covers low dose CT scan once per year if you meet all of the following conditions: (1) Age 55-77; (2) No signs or symptoms of lung cancer; (3) Current smoker or have quit smoking within the last 15 years; (4) You have a tobacco smoking history of at least 20 pack years (packs per day x number of years you smoked); (5) You get a written order from a healthcare provider.  Glaucoma Screening: covered annually if you're considered high risk: (1) You have diabetes OR (2) Family history of glaucoma OR (3)  aged 50 and older OR (4)  American aged 65 and older  Osteoporosis Screening: covered every 2 years if you meet one of the following conditions: (1) Have a vertebral abnormality; (2) On glucocorticoid therapy for more than 3 months; (3) Have primary hyperparathyroidism; (4) On osteoporosis medications and need to assess response to drug therapy.  HIV Screening: covered annually if you're between the age of 15-65. Also covered annually if you are younger than 15 and older than 65 with risk factors for HIV infection. For pregnant patients, it is covered up to 3 times per pregnancy.    Immunizations:  Immunization Recommendations   Influenza Vaccine Annual influenza vaccination during flu season is recommended for all persons aged >= 6 months who do not have contraindications   Pneumococcal Vaccine   * Pneumococcal conjugate vaccine = PCV13 (Prevnar 13), PCV15 (Vaxneuvance), PCV20 (Prevnar 20)  * Pneumococcal polysaccharide vaccine  = PPSV23 (Pneumovax) Adults 19-65 yo with certain risk factors or if 65+ yo  If never received any pneumonia vaccine: recommend Prevnar 20 (PCV20)  Give PCV20 if previously received 1 dose of PCV13 or PPSV23   Hepatitis B Vaccine 3 dose series if at intermediate or high risk (ex: diabetes, end stage renal disease, liver disease)   Respiratory syncytial virus (RSV) Vaccine - COVERED BY MEDICARE PART D  * RSVPreF3 (Arexvy) CDC recommends that adults 60 years of age and older may receive a single dose of RSV vaccine using shared clinical decision-making (SCDM)   Tetanus (Td) Vaccine - COST NOT COVERED BY MEDICARE PART B Following completion of primary series, a booster dose should be given every 10 years to maintain immunity against tetanus. Td may also be given as tetanus wound prophylaxis.   Tdap Vaccine - COST NOT COVERED BY MEDICARE PART B Recommended at least once for all adults. For pregnant patients, recommended with each pregnancy.   Shingles Vaccine (Shingrix) - COST NOT COVERED BY MEDICARE PART B  2 shot series recommended in those 19 years and older who have or will have weakened immune systems or those 50 years and older     Health Maintenance Due:      Topic Date Due   • Colorectal Cancer Screening  03/09/2026   • Hepatitis C Screening  Completed     Immunizations Due:      Topic Date Due   • Pneumococcal Vaccine: 65+ Years (1 of 2 - PCV) Never done   • COVID-19 Vaccine (2 - 2024-25 season) 09/01/2024     Advance Directives   What are advance directives?  Advance directives are legal documents that state your wishes and plans for medical care. These plans are made ahead of time in case you lose your ability to make decisions for yourself. Advance directives can apply to any medical decision, such as the treatments you want, and if you want to donate organs.   What are the types of advance directives?  There are many types of advance directives, and each state has rules about how to use them. You may choose  a combination of any of the following:  Living will:  This is a written record of the treatment you want. You can also choose which treatments you do not want, which to limit, and which to stop at a certain time. This includes surgery, medicine, IV fluid, and tube feedings.   Durable power of  for healthcare (DPAHC):  This is a written record that states who you want to make healthcare choices for you when you are unable to make them for yourself. This person, called a proxy, is usually a family member or a friend. You may choose more than 1 proxy.  Do not resuscitate (DNR) order:  A DNR order is used in case your heart stops beating or you stop breathing. It is a request not to have certain forms of treatment, such as CPR. A DNR order may be included in other types of advance directives.  Medical directive:  This covers the care that you want if you are in a coma, near death, or unable to make decisions for yourself. You can list the treatments you want for each condition. Treatment may include pain medicine, surgery, blood transfusions, dialysis, IV or tube feedings, and a ventilator (breathing machine).  Values history:  This document has questions about your views, beliefs, and how you feel and think about life. This information can help others choose the care that you would choose.  Why are advance directives important?  An advance directive helps you control your care. Although spoken wishes may be used, it is better to have your wishes written down. Spoken wishes can be misunderstood, or not followed. Treatments may be given even if you do not want them. An advance directive may make it easier for your family to make difficult choices about your care.   Weight Management   Why it is important to manage your weight:  Being overweight increases your risk of health conditions such as heart disease, high blood pressure, type 2 diabetes, and certain types of cancer. It can also increase your risk for  osteoarthritis, sleep apnea, and other respiratory problems. Aim for a slow, steady weight loss. Even a small amount of weight loss can lower your risk of health problems.  How to lose weight safely:  A safe and healthy way to lose weight is to eat fewer calories and get regular exercise. You can lose up about 1 pound a week by decreasing the number of calories you eat by 500 calories each day.   Healthy meal plan for weight management:  A healthy meal plan includes a variety of foods, contains fewer calories, and helps you stay healthy. A healthy meal plan includes the following:  Eat whole-grain foods more often.  A healthy meal plan should contain fiber. Fiber is the part of grains, fruits, and vegetables that is not broken down by your body. Whole-grain foods are healthy and provide extra fiber in your diet. Some examples of whole-grain foods are whole-wheat breads and pastas, oatmeal, brown rice, and bulgur.  Eat a variety of vegetables every day.  Include dark, leafy greens such as spinach, kale, veronika greens, and mustard greens. Eat yellow and orange vegetables such as carrots, sweet potatoes, and winter squash.   Eat a variety of fruits every day.  Choose fresh or canned fruit (canned in its own juice or light syrup) instead of juice. Fruit juice has very little or no fiber.  Eat low-fat dairy foods.  Drink fat-free (skim) milk or 1% milk. Eat fat-free yogurt and low-fat cottage cheese. Try low-fat cheeses such as mozzarella and other reduced-fat cheeses.  Choose meat and other protein foods that are low in fat.  Choose beans or other legumes such as split peas or lentils. Choose fish, skinless poultry (chicken or turkey), or lean cuts of red meat (beef or pork). Before you cook meat or poultry, cut off any visible fat.   Use less fat and oil.  Try baking foods instead of frying them. Add less fat, such as margarine, sour cream, regular salad dressing and mayonnaise to foods. Eat fewer high-fat foods. Some  examples of high-fat foods include french fries, doughnuts, ice cream, and cakes.  Eat fewer sweets.  Limit foods and drinks that are high in sugar. This includes candy, cookies, regular soda, and sweetened drinks.  Exercise:  Exercise at least 30 minutes per day on most days of the week. Some examples of exercise include walking, biking, dancing, and swimming. You can also fit in more physical activity by taking the stairs instead of the elevator or parking farther away from stores. Ask your healthcare provider about the best exercise plan for you.      © Copyright [employer] 2018 Information is for End User's use only and may not be sold, redistributed or otherwise used for commercial purposes. All illustrations and images included in CareNotes® are the copyrighted property of [employer] or [employer]

## 2024-12-10 NOTE — PROGRESS NOTES
Name: Noe Molina      : 1950      MRN: [id number]  Encounter Provider: Rocio Hummel DO  Encounter Date: 12/10/2024   Encounter department: [facility] [address]    Assessment & Plan  Obesity, morbid (HCC)         Ulcerative rectosigmoiditis without complication (HCC)  Stable, follows with GI.        Encounter for Medicare annual wellness exam         Prostate cancer screening    Orders:  •  PSA, Total Screen; Future    Essential hypertension  Controlled with HCTZ 12.5 mg daily,  Amlodipine 5 mg daily and Benazepril 20 mg daily. Continue with atorvastatin 20 mg.   Orders:  •  CBC and differential; Future  •  Comprehensive metabolic panel; Future  •  Lipid panel; Future    Primary osteoarthritis of both knees  Discussed continued exercise/PT. Patient not interested in injections (steroid or gel) or surgery.           Preventive health issues were discussed with patient, and age appropriate screening tests were ordered as noted in patient's After Visit Summary. Personalized health advice and appropriate referrals for health education or preventive services given if needed, as noted in patient's After Visit Summary.    History of Present Illness     HPI   Patient Care Team:  Rocio Hummel DO as PCP - General (Family Medicine)    Patient presents to office for annual visit. States that things are going well.     Notes that his knees are bothersome but he does not want injections. States that he goes to the gym 3-4 days per week and this has been helpful. Notes that he is not interested in surgery for the knees at this time. He is doing all of the exercises that he previously did at PT.     Review of Systems  Medical History Reviewed by provider this encounter:  Tobacco  Allergies  Meds  Problems  Med Hx  Surg Hx  Fam Hx       Annual Wellness Visit Questionnaire   Noe is here for his Subsequent Wellness visit.     Health Risk Assessment:   Patient  rates overall health as good. Patient feels that their physical health rating is same. Patient is very satisfied with their life. Eyesight was rated as same. Hearing was rated as same. Patient feels that their emotional and mental health rating is same. Patients states they are never, rarely angry. Patient states they are sometimes unusually tired/fatigued. Pain experienced in the last 7 days has been none. Patient states that he has experienced no weight loss or gain in last 6 months.     Depression Screening:   PHQ-2 Score: 0      Fall Risk Screening:   In the past year, patient has experienced: no history of falling in past year      Home Safety:  Patient has trouble with stairs inside or outside of their home. Patient has working smoke alarms and has working carbon monoxide detector. Home safety hazards include: none.     Nutrition:   Current diet is Regular.     Medications:   Patient is not currently taking any over-the-counter supplements. Patient is able to manage medications.     Activities of Daily Living (ADLs)/Instrumental Activities of Daily Living (IADLs):   Walk and transfer into and out of bed and chair?: Yes  Dress and groom yourself?: Yes    Bathe or shower yourself?: Yes    Feed yourself? Yes  Do your laundry/housekeeping?: Yes  Manage your money, pay your bills and track your expenses?: Yes  Make your own meals?: Yes    Do your own shopping?: Yes    Previous Hospitalizations:   Any hospitalizations or ED visits within the last 12 months?: No      Advance Care Planning:   Living will: No    Durable POA for healthcare: No    Advanced directive: No      PREVENTIVE SCREENINGS      Cardiovascular Screening:    General: Screening Not Indicated and History Lipid Disorder      Colorectal Cancer Screening:     General: Screening Current      Abdominal Aortic Aneurysm (AAA) Screening:    Risk factors include: age between 65-76 yo and tobacco use        Lung Cancer Screening:     General: Screening Not  "Indicated      Hepatitis C Screening:    General: Screening Current    Screening, Brief Intervention, and Referral to Treatment (SBIRT)    Screening  Typical number of drinks in a day: 0  Typical number of drinks in a week: 0  Interpretation: Low risk drinking behavior.    Single Item Drug Screening:  How often have you used an illegal drug (including marijuana) or a prescription medication for non-medical reasons in the past year? never    Single Item Drug Screen Score: 0  Interpretation: Negative screen for possible drug use disorder    Other Counseling Topics:   Car/seat belt/driving safety.     Social Drivers of Health     Financial Resource Strain: Low Risk  (12/6/2023)    Overall Financial Resource Strain (CARDIA)    • Difficulty of Paying Living Expenses: Not hard at all   Food Insecurity: No Food Insecurity (12/10/2024)    Hunger Vital Sign    • Worried About Running Out of Food in the Last Year: Never true    • Ran Out of Food in the Last Year: Never true   Transportation Needs: No Transportation Needs (12/10/2024)    PRAPARE - Transportation    • Lack of Transportation (Medical): No    • Lack of Transportation (Non-Medical): No   Housing Stability: Low Risk  (12/10/2024)    Housing Stability Vital Sign    • Unable to Pay for Housing in the Last Year: No    • Number of Times Moved in the Last Year: 0    • Homeless in the Last Year: No   Utilities: Not At Risk (12/10/2024)    [facility] Utilities    • Threatened with loss of utilities: No     No results found.    Objective   /80 (BP Location: Left arm, Patient Position: Sitting, Cuff Size: Large)   Pulse 65   Temp 98.4 °F (36.9 °C) (Temporal)   Ht 5' 10\" (1.778 m)   Wt 114 kg (252 lb)   SpO2 95%   BMI 36.16 kg/m²     Physical Exam  Vitals reviewed.   Constitutional:       General: He is not in acute distress.     Appearance: Normal appearance.   HENT:      Head: Normocephalic and atraumatic.      Right Ear: External ear normal.      Left Ear: External " ear normal.      Nose: Nose normal.      Mouth/Throat:      Mouth: Mucous membranes are moist.   Eyes:      Extraocular Movements: Extraocular movements intact.      Conjunctiva/sclera: Conjunctivae normal.      Pupils: Pupils are equal, round, and reactive to light.   Cardiovascular:      Rate and Rhythm: Normal rate and regular rhythm.      Heart sounds: Normal heart sounds.   Pulmonary:      Effort: Pulmonary effort is normal.      Breath sounds: Normal breath sounds. No wheezing, rhonchi or rales.   Abdominal:      General: Bowel sounds are normal. There is no distension.      Palpations: Abdomen is soft.      Tenderness: There is no abdominal tenderness.   Musculoskeletal:      Right lower leg: No edema.      Left lower leg: No edema.      Comments: Left index finger ganglion cyst.    Skin:     General: Skin is warm.      Capillary Refill: Capillary refill takes less than 2 seconds.      Findings: No rash.   Neurological:      Mental Status: He is alert. Mental status is at baseline.           DO Rylan Andrews [facility][1]  12/10/2024 7:33 AM

## 2024-12-10 NOTE — ASSESSMENT & PLAN NOTE
Discussed continued exercise/PT. Patient not interested in injections (steroid or gel) or surgery.

## 2024-12-11 DIAGNOSIS — K51.30 ULCERATIVE RECTOSIGMOIDITIS WITHOUT COMPLICATION (HCC): ICD-10-CM

## 2024-12-11 RX ORDER — MESALAMINE 0.38 G/1
CAPSULE, EXTENDED RELEASE ORAL
Qty: 120 CAPSULE | Refills: 0 | Status: SHIPPED | OUTPATIENT
Start: 2024-12-11

## 2024-12-11 NOTE — TELEPHONE ENCOUNTER
Reason for call:   [x] Refill   [] Prior Auth  [] Other:     Office:   [] PCP/Provider -   [x] Specialty/Provider -     Medication: Apriso    Dose/Frequency: 0.375 g 24 hr capsule    Quantity: #360    Pharmacy: [address]    Does the patient have enough for 3 days?   [x] Yes   [] No - Send as HP to POD

## 2025-01-04 DIAGNOSIS — K51.30 ULCERATIVE RECTOSIGMOIDITIS WITHOUT COMPLICATION (HCC): Primary | ICD-10-CM

## 2025-01-08 RX ORDER — MESALAMINE 0.38 G/1
1.5 CAPSULE, EXTENDED RELEASE ORAL DAILY
Qty: 360 CAPSULE | Refills: 1 | Status: SHIPPED | OUTPATIENT
Start: 2025-01-08

## 2025-01-13 ENCOUNTER — APPOINTMENT (OUTPATIENT)
Dept: LAB | Facility: HOSPITAL | Age: 75
End: 2025-01-13
Attending: FAMILY MEDICINE
Payer: COMMERCIAL

## 2025-01-13 DIAGNOSIS — I10 ESSENTIAL HYPERTENSION: ICD-10-CM

## 2025-01-13 DIAGNOSIS — Z12.5 PROSTATE CANCER SCREENING: ICD-10-CM

## 2025-01-13 LAB
ALBUMIN SERPL BCG-MCNC: 4.2 G/DL (ref 3.5–5)
ALP SERPL-CCNC: 71 U/L (ref 34–104)
ALT SERPL W P-5'-P-CCNC: 27 U/L (ref 7–52)
ANION GAP SERPL CALCULATED.3IONS-SCNC: 7 MMOL/L (ref 4–13)
AST SERPL W P-5'-P-CCNC: 17 U/L (ref 13–39)
BASOPHILS # BLD AUTO: 0.05 THOUSANDS/ΜL (ref 0–0.1)
BASOPHILS NFR BLD AUTO: 1 % (ref 0–1)
BILIRUB SERPL-MCNC: 1.08 MG/DL (ref 0.2–1)
BUN SERPL-MCNC: 16 MG/DL (ref 5–25)
CALCIUM SERPL-MCNC: 9.1 MG/DL (ref 8.4–10.2)
CHLORIDE SERPL-SCNC: 103 MMOL/L (ref 96–108)
CHOLEST SERPL-MCNC: 154 MG/DL (ref ?–200)
CO2 SERPL-SCNC: 30 MMOL/L (ref 21–32)
CREAT SERPL-MCNC: 0.8 MG/DL (ref 0.6–1.3)
EOSINOPHIL # BLD AUTO: 0.23 THOUSAND/ΜL (ref 0–0.61)
EOSINOPHIL NFR BLD AUTO: 3 % (ref 0–6)
ERYTHROCYTE [DISTWIDTH] IN BLOOD BY AUTOMATED COUNT: 13.5 % (ref 11.6–15.1)
GFR SERPL CREATININE-BSD FRML MDRD: 89 ML/MIN/1.73SQ M
GLUCOSE P FAST SERPL-MCNC: 95 MG/DL (ref 65–99)
HCT VFR BLD AUTO: 47.4 % (ref 36.5–49.3)
HDLC SERPL-MCNC: 49 MG/DL
HGB BLD-MCNC: 15.7 G/DL (ref 12–17)
IMM GRANULOCYTES # BLD AUTO: 0.03 THOUSAND/UL (ref 0–0.2)
IMM GRANULOCYTES NFR BLD AUTO: 0 % (ref 0–2)
LDLC SERPL CALC-MCNC: 77 MG/DL (ref 0–100)
LYMPHOCYTES # BLD AUTO: 1.85 THOUSANDS/ΜL (ref 0.6–4.47)
LYMPHOCYTES NFR BLD AUTO: 23 % (ref 14–44)
MCH RBC QN AUTO: 29.3 PG (ref 26.8–34.3)
MCHC RBC AUTO-ENTMCNC: 33.1 G/DL (ref 31.4–37.4)
MCV RBC AUTO: 88 FL (ref 82–98)
MONOCYTES # BLD AUTO: 0.75 THOUSAND/ΜL (ref 0.17–1.22)
MONOCYTES NFR BLD AUTO: 9 % (ref 4–12)
NEUTROPHILS # BLD AUTO: 5.26 THOUSANDS/ΜL (ref 1.85–7.62)
NEUTS SEG NFR BLD AUTO: 64 % (ref 43–75)
NONHDLC SERPL-MCNC: 105 MG/DL
NRBC BLD AUTO-RTO: 0 /100 WBCS
PLATELET # BLD AUTO: 238 THOUSANDS/UL (ref 149–390)
PMV BLD AUTO: 10.4 FL (ref 8.9–12.7)
POTASSIUM SERPL-SCNC: 4.1 MMOL/L (ref 3.5–5.3)
PROT SERPL-MCNC: 7.1 G/DL (ref 6.4–8.4)
PSA SERPL-MCNC: 1.47 NG/ML (ref 0–4)
RBC # BLD AUTO: 5.36 MILLION/UL (ref 3.88–5.62)
SODIUM SERPL-SCNC: 140 MMOL/L (ref 135–147)
TRIGL SERPL-MCNC: 140 MG/DL (ref ?–150)
WBC # BLD AUTO: 8.17 THOUSAND/UL (ref 4.31–10.16)

## 2025-01-13 PROCEDURE — 80061 LIPID PANEL: CPT

## 2025-01-13 PROCEDURE — 36415 COLL VENOUS BLD VENIPUNCTURE: CPT

## 2025-01-13 PROCEDURE — 85025 COMPLETE CBC W/AUTO DIFF WBC: CPT

## 2025-01-13 PROCEDURE — 80053 COMPREHEN METABOLIC PANEL: CPT

## 2025-01-13 PROCEDURE — G0103 PSA SCREENING: HCPCS

## 2025-01-22 DIAGNOSIS — I10 ESSENTIAL HYPERTENSION: ICD-10-CM

## 2025-01-22 RX ORDER — HYDROCHLOROTHIAZIDE 12.5 MG/1
12.5 TABLET ORAL DAILY
Qty: 90 TABLET | Refills: 1 | Status: SHIPPED | OUTPATIENT
Start: 2025-01-22

## 2025-01-28 DIAGNOSIS — I10 ESSENTIAL HYPERTENSION: ICD-10-CM

## 2025-01-28 RX ORDER — BENAZEPRIL HYDROCHLORIDE 20 MG/1
20 TABLET ORAL DAILY
Qty: 90 TABLET | Refills: 1 | Status: SHIPPED | OUTPATIENT
Start: 2025-01-28

## 2025-02-06 ENCOUNTER — RESULTS FOLLOW-UP (OUTPATIENT)
Dept: FAMILY MEDICINE CLINIC | Facility: CLINIC | Age: 75
End: 2025-02-06

## 2025-02-07 NOTE — TELEPHONE ENCOUNTER
Patient called requesting refill for benazepril. Patient made aware medication was refilled on 1/28/25 for 90 with 1 refills to [facility] pharmacy. Patient instructed to contact the pharmacy to obtain refills of medication. Patient verbalized understanding.

## 2025-02-19 DIAGNOSIS — I10 ESSENTIAL HYPERTENSION: ICD-10-CM

## 2025-02-19 RX ORDER — AMLODIPINE BESYLATE 5 MG/1
5 TABLET ORAL DAILY
Qty: 100 TABLET | Refills: 1 | Status: SHIPPED | OUTPATIENT
Start: 2025-02-19

## 2025-03-31 DIAGNOSIS — J45.20 MILD INTERMITTENT EXTRINSIC ASTHMA WITHOUT COMPLICATION: ICD-10-CM

## 2025-03-31 DIAGNOSIS — J30.2 SEASONAL ALLERGIES: ICD-10-CM

## 2025-03-31 RX ORDER — ALBUTEROL SULFATE 90 UG/1
INHALANT RESPIRATORY (INHALATION)
Qty: 8.5 G | Refills: 1 | Status: SHIPPED | OUTPATIENT
Start: 2025-03-31

## 2025-05-06 DIAGNOSIS — E78.2 MIXED HYPERLIPIDEMIA: ICD-10-CM

## 2025-05-06 RX ORDER — ROSUVASTATIN CALCIUM 10 MG/1
10 TABLET, COATED ORAL DAILY
Qty: 90 TABLET | Refills: 1 | Status: SHIPPED | OUTPATIENT
Start: 2025-05-06

## 2025-06-17 DIAGNOSIS — J45.20 MILD INTERMITTENT EXTRINSIC ASTHMA WITHOUT COMPLICATION: ICD-10-CM

## 2025-06-17 DIAGNOSIS — J30.2 SEASONAL ALLERGIES: ICD-10-CM

## 2025-06-17 RX ORDER — ALBUTEROL SULFATE 90 UG/1
2 INHALANT RESPIRATORY (INHALATION) EVERY 6 HOURS PRN
Qty: 8.5 G | Refills: 2 | Status: SHIPPED | OUTPATIENT
Start: 2025-06-17

## 2025-06-17 NOTE — TELEPHONE ENCOUNTER
Reason for call:   [x] Refill   [] Prior Auth  [] Other:     Office:   [x] PCP/Provider -   [] Specialty/Provider -     Medication:   albuterol (PROVENTIL HFA,VENTOLIN HFA) 90 mcg/act inhaler    Dose/Frequency: Sig: TAKE 2 PUFFS BY MOUTH EVERY 6 HOURS AS NEEDED FOR WHEEZE      Quantity: 8.5g    Pharmacy:   [facility]/pharmacy #[id number] - [place] - [address] [address]  Phone: [phone]  Fax: [phone]  ELSY #: [id number]    Local Pharmacy   Does the patient have enough for 3 days?   [x] Yes   [] No - Send as HP to POD    Mail Away Pharmacy   Does the patient have enough for 10 days?   [] Yes   [] No - Send as HP to POD

## 2025-07-02 DIAGNOSIS — K51.30 ULCERATIVE RECTOSIGMOIDITIS WITHOUT COMPLICATION (HCC): ICD-10-CM

## 2025-07-02 DIAGNOSIS — I10 ESSENTIAL HYPERTENSION: ICD-10-CM

## 2025-07-02 RX ORDER — HYDROCHLOROTHIAZIDE 12.5 MG/1
12.5 TABLET ORAL DAILY
Qty: 90 TABLET | Refills: 1 | Status: SHIPPED | OUTPATIENT
Start: 2025-07-02

## 2025-07-02 RX ORDER — MESALAMINE 0.38 G/1
1.5 CAPSULE, EXTENDED RELEASE ORAL DAILY
Qty: 360 CAPSULE | Refills: 1 | Status: SHIPPED | OUTPATIENT
Start: 2025-07-02

## 2025-07-10 DIAGNOSIS — I10 ESSENTIAL HYPERTENSION: ICD-10-CM

## 2025-07-10 RX ORDER — BENAZEPRIL HYDROCHLORIDE 20 MG/1
20 TABLET ORAL DAILY
Qty: 90 TABLET | Refills: 1 | Status: SHIPPED | OUTPATIENT
Start: 2025-07-10